# Patient Record
Sex: FEMALE | Employment: STUDENT | URBAN - METROPOLITAN AREA
[De-identification: names, ages, dates, MRNs, and addresses within clinical notes are randomized per-mention and may not be internally consistent; named-entity substitution may affect disease eponyms.]

---
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
## 2020-01-04 DIAGNOSIS — R53.83 FATIGUE, UNSPECIFIED TYPE: Primary | ICD-10-CM

## 2020-01-06 DIAGNOSIS — R53.83 FATIGUE, UNSPECIFIED TYPE: ICD-10-CM

## 2020-01-06 LAB
FERRITIN SERPL-MCNC: 44 NG/ML (ref 12–150)
HGB BLD-MCNC: 13.7 G/DL (ref 11.7–15.7)

## 2020-03-10 ENCOUNTER — HEALTH MAINTENANCE LETTER (OUTPATIENT)
Age: 20
End: 2020-03-10

## 2020-09-11 DIAGNOSIS — Z11.59 SPECIAL SCREENING EXAMINATION FOR VIRAL DISEASE: ICD-10-CM

## 2020-09-13 DIAGNOSIS — Z11.59 SPECIAL SCREENING EXAMINATION FOR VIRAL DISEASE: ICD-10-CM

## 2020-09-13 LAB
COVID-19 ANTIBODY IGG: NEGATIVE
LAB TEST METHOD: NORMAL
SARS-COV-2 RNA SPEC QL NAA+PROBE: NOT DETECTED
SARS-COV-2 RNA SPEC QL NAA+PROBE: NOT DETECTED
SPECIMEN SOURCE: NORMAL
SPECIMEN SOURCE: NORMAL

## 2020-10-30 ENCOUNTER — DOCUMENTATION ONLY (OUTPATIENT)
Dept: FAMILY MEDICINE | Facility: CLINIC | Age: 20
End: 2020-10-30

## 2020-11-07 NOTE — PROGRESS NOTES
[facility] ATHLETIC MEDICINE  [facility]   Sport Psychology Intake Note      Location of Visit: Encounter was conducted via telehealth. Barbara reports that her physical location is: [address]  Date of Visit: October 30, 2020  Duration of Session: 45 minutes  Referred by: self    Barbara Olivera is a 20 year old American female.  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 She is not an international student.     Self-reported Concerns/Symptoms:  Academic concerns  Anxiety/worry  Concentration/attention  Family problems  Identity  Leadership    Presenting Concern:  Desire to explore and process difficult emotions and receive supportive counseling related to life stressors.    Suicide and Risk Assessment:  Recent suicidal thoughts: No  Past suicidal thoughts: No  Recent homicidal thoughts: No  Any attempts in the past: No    Barbara denies current urges to self-harm, homicidal ideation, suicidal ideation, means, plans, or intent.    Mental Status & Observations:  Barbara appeared generally alert and oriented. Dress was appropriate to the weather and occasion. Grooming and hygiene were appropriate. Eye contact was good. Speech was of normal volume and normal. Thought processes were relevant, logical and goal-directed. Thought content was within normal limits with no evidence of psychotic or paranoid features. Memory appeared intact. She exhibited normal motor activity during the appointment. Mood was appropriate with congruent affect. Insight and judgment appeared age appropriate with good focus in session.  Behavior was cooperative and engaging    Family Background:  Born and raised in [place]. Mother and father are  and live in family home in [place]. Barbara is the oldest of seven, youngest sibling is three years old. Reports that relationship with parents is good/supportive, describes her mom as her bestfriend during childhood/upbringing. At present,  closer with father who she describes as her second . Reports closest sibling relationship to be with her twelve year old brother who has a diagnosis of autism.     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 Reports that she is doing well academically.     Social:  Barbara reports having a supportive social community network of peers. She noted conflict with ex-friend/romantic partner that developed into a legal situation to address stalking behavior and harassment from the individual.     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 She has been engaged in the sport for thirteen years. Reports good relationship with teammates and coaches.    History of medical and mental health concerns:  Concussion: No  Current/past sports injury: Yes: History of stress fractures. No current injury endorsed.  Nutrition/eating/appetite: No  Body image: No  Sleep: No   Substance use (alcohol, caffeine, tobacco, cannabis, other): No  Family history of substance abuse: No  Medications/vitamins/supplements: None  Concentration/focus/ADHD: No  Caffeine use: No  PTSD/trauma/abuse: No  Significant loss: No  Known history of mental health in self: No  History of therapy or prescribed medications: No  Known history of mental health in family member(s): No  Legal issues: Yes, currently addressing legal concern in which she was the target of stalking behavior from a known peer.    Coping skills, strengths and supports:   Social support system and Use of available services    Clinical impressions or Other:  Barbara arrived to session on time and was actively engaged. Encounter served as intake session to review confidentiality, establish rapport, and determine goals for services.    Background and history was gathered. Empathetic listening and support provided throughout.     Therapy  objectives/goals:  Provide support  Teach and improve coping skills    Therapy follow-up plan:  Individual counseling sessions as needed      Selma Le PsyD

## 2020-11-09 NOTE — PROGRESS NOTES
I have reviewed and agree with the document of this note.  I did not personally see the patient.    Rm Weiner, PhD LP, CMPC

## 2020-11-13 ENCOUNTER — DOCUMENTATION ONLY (OUTPATIENT)
Dept: FAMILY MEDICINE | Facility: CLINIC | Age: 20
End: 2020-11-13

## 2020-11-19 NOTE — PROGRESS NOTES
[facility] ATHLETIC MEDICINE  [facility]   Sport Psychology Progress Note      Location of Visit: Encounter was conducted via telehealth. Barbara reports that her physical location is: [address]  Date of Visit: November 13, 2020  Duration of Session: 45 minutes    Suicide Assessment:  Barbara denies current urges to self-harm, suicidal ideation, means, plan, or intent.    Mental Status & Observations:  Barbara appeared generally alert and oriented. Dress was appropriate to the weather and occasion. Grooming and hygiene were appropriate. Eye contact was good. Speech was of normal volume and normal. Mood was appropriate with congruent affect. Thought processes were relevant, logical and goal-directed. Thought content was within normal limits with no evidence of psychotic or paranoid features. Memory appeared intact. Insight and judgment appeared age appropriate with good focus in session.  She exhibited normal motor activity during the appointment.  Behavior was cooperative and engaging.      Observations and response to counseling:  Barbara arrived to session on time and was actively engaged throughout.     Noted increased stress related to presidential election. Reports recent difficult conversations with adopted white parents on issues related to racial identity. Session largely utilized to explore elements of her Black identity.    Reports academic concern, specifically, not performing well in one class (intro to financial report writing).     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 Addressed conflict within team dynamic.    Intervention:  Empathetic listening and supportive counseling provided throughout. Explored aspects of her racial identity. Discussed conflict when talking about race with adopted white parents. Noted challenges exploring her identity while growing  up. Identified how this has impacted her ability to connect with Black peers at times.    Validated experience and reaffirmed the many facets of the Black identity.     Encouraged use of effective communication with parents, praised athlete's willingness to take on leadership role within [employer].    Encouraged use of time management, stress reductions strategies, and self care activity.     Therapy objectives/goals:  Increase self-awareness  Increase willingness to be vulnerable and experience emotions  Improve and develop time-management skills  Provide support  Teach and improve coping skills    Therapy follow-up plan:  Individual counseling sessions biweekly      Selma Le PsyD

## 2020-11-25 ENCOUNTER — OFFICE VISIT (OUTPATIENT)
Dept: ORTHOPEDICS | Facility: CLINIC | Age: 20
End: 2020-11-25
Payer: COMMERCIAL

## 2020-11-25 VITALS
DIASTOLIC BLOOD PRESSURE: 83 MMHG | BODY MASS INDEX: 21.33 KG/M2 | HEART RATE: 69 BPM | WEIGHT: 144 LBS | HEIGHT: 69 IN | SYSTOLIC BLOOD PRESSURE: 118 MMHG

## 2020-11-25 DIAGNOSIS — M84.30XA STRESS REACTION OF BONE: Primary | ICD-10-CM

## 2020-11-25 ASSESSMENT — MIFFLIN-ST. JEOR: SCORE: 1487.56

## 2020-11-25 NOTE — PROGRESS NOTES
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 Noticed after starting back to hurdles. Has h/o left 2nd MT stress fracture in high school and stress reaction in 3/2019. Has been pain free since then until now. Localizes over prox 3rd MT. Pain after running. No swelling. Periods normal - on OCP. No concerns about body image. Not restricting any food groups. Weight stable.          Medications:     Current Outpatient Medications   Medication Sig     amoxicillin-clavulanate (AUGMENTIN) 500-125 MG per tablet Take 1 tablet by mouth 2 times daily (Patient not taking: Reported on 1/30/2019)     aspirin-acetaminophen-caffeine (EXCEDRIN MIGRAINE) 250-250-65 MG tablet Take 1 tablet by mouth every 6 hours as needed for headaches     No current facility-administered medications for this visit.              Allergies:      Allergies   Allergen Reactions     Seasonal Allergies             Review of Systems:   A comprehensive 10 point review of systems (constitutional, ENT, cardiac, peripheral vascular, respiratory, GI, , Musculoskeletal, skin, Neurological) was performed and found to be negative except as described in this note.           Physical Exam:   COMPLETE EXAMINATION:   VITAL SIGNS: /83   Pulse 69   Ht 1.753 m (5' 9\")   Wt 65.3 kg (144 lb)   BMI 21.27 kg/m    GEN: Alert, well-nourished, and in no distress.   NEURO: Alert and oriented to person, place, and time. Gait normal.   SKIN: No rash, warmth or erythema  PSYCH: Mood, memory, affect and judgment normal.   MSK: left foot: no swelling, TTP along proximal 3rd MT, mild TTP prox 2nd MT; SLH only mildly +         Imaging:   MR left foot without  contrast 1/31/2019 2:02 PM     History: Stress (fatigue) fx, known, foot, sports eval; 2nd MT " pain,  h/o prior stress fracture; Stress fracture of metatarsal bone of left  foot, initial encounter     Techniques: Multiplanar multisequence imaging of the left foot was  obtained without  administration of intravenous contrast.     Comparison: 1/29/2019     Findings:     Bones     A marker is placed at the dorsal aspect of the forefoot over the  second metatarsal base. Underlying marker, there is questionable  subtle T2 hyperintensity without associated T1 hypointensity involving  the base of the second metatarsal, likely low-grade stress reaction  without associated fracture line. No underlying subcutaneous edema or  soft tissue swelling.     Joints and periarticular soft tissue     Joint effusion: Physiologic amount of joint fluid are present.     Plantar plates: Intersesamoidal ligament and sesamoidal phalangeal  ligaments of the first metatarsophalangeal joints are intact. Plantar  plates of the second through fifth toe at metatarsophalangeal joints  are grossly intact.     Intermetatarsal spaces: No interdigital neuroma. No intermetatarsal  bursitis.     Ligaments and Tendons     Lisfranc interosseous ligament: Intact.     Tendons: The visualized courses of flexor and extensor tendons are  intact.      Muscles     No atrophy or edema.     ANCILLARY FINDINGS                                                                         Impression: Corresponding to the marker, questionable subtle T2  hyperintensity without associated T1 hypointensity involving the base  of the second metatarsal, likely low-grade stress reaction without  associated fracture line.           Assessment:     Recurrent left foot stress reaction - now localizing along 3rd proximal MT        Plan:     1. CAM boot for the next 2-4 weeks.  2. Cross train limiting impact and running under direction of ATC.  3. Follow up in 1 month. If sx fail to improve, will obtain repeat imaging at that time.    Karla Mercado M.D.    CAESAR Hannah  was present for the entire visit.

## 2020-11-25 NOTE — LETTER
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 Noticed after starting back to hurdles. Has h/o left 2nd MT stress fracture in high school and stress reaction in 3/2019. Has been pain free since then until now. Localizes over prox 3rd MT. Pain after running. No swelling. Periods normal - on OCP. No concerns about body image. Not restricting any food groups. Weight stable.          Medications:     Current Outpatient Medications   Medication Sig     amoxicillin-clavulanate (AUGMENTIN) 500-125 MG per tablet Take 1 tablet by mouth 2 times daily (Patient not taking: Reported on 1/30/2019)     aspirin-acetaminophen-caffeine (EXCEDRIN MIGRAINE) 250-250-65 MG tablet Take 1 tablet by mouth every 6 hours as needed for headaches     No current facility-administered medications for this visit.              Allergies:      Allergies   Allergen Reactions     Seasonal Allergies             Review of Systems:   A comprehensive 10 point review of systems (constitutional, ENT, cardiac, peripheral vascular, respiratory, GI, , Musculoskeletal, skin, Neurological) was performed and found to be negative except as described in this note.           Physical Exam:   COMPLETE EXAMINATION:   VITAL SIGNS: /83   Pulse 69   Ht 1.753 m (5' 9\")   Wt 65.3 kg (144 lb)   BMI 21.27 kg/m    GEN: Alert, well-nourished, and in no distress.   NEURO: Alert and oriented to person, place, and time. Gait normal.   SKIN: No rash, warmth or erythema  PSYCH: Mood, memory, affect and judgment normal.   MSK: left foot: no swelling, TTP along proximal 3rd MT, mild TTP prox 2nd MT; SLH only mildly +         Imaging:   MR left foot without  contrast " 1/31/2019 2:02 PM     History: Stress (fatigue) fx, known, foot, sports eval; 2nd MT pain,  h/o prior stress fracture; Stress fracture of metatarsal bone of left  foot, initial encounter     Techniques: Multiplanar multisequence imaging of the left foot was  obtained without  administration of intravenous contrast.     Comparison: 1/29/2019     Findings:     Bones     A marker is placed at the dorsal aspect of the forefoot over the  second metatarsal base. Underlying marker, there is questionable  subtle T2 hyperintensity without associated T1 hypointensity involving  the base of the second metatarsal, likely low-grade stress reaction  without associated fracture line. No underlying subcutaneous edema or  soft tissue swelling.     Joints and periarticular soft tissue     Joint effusion: Physiologic amount of joint fluid are present.     Plantar plates: Intersesamoidal ligament and sesamoidal phalangeal  ligaments of the first metatarsophalangeal joints are intact. Plantar  plates of the second through fifth toe at metatarsophalangeal joints  are grossly intact.     Intermetatarsal spaces: No interdigital neuroma. No intermetatarsal  bursitis.     Ligaments and Tendons     Lisfranc interosseous ligament: Intact.     Tendons: The visualized courses of flexor and extensor tendons are  intact.      Muscles     No atrophy or edema.     ANCILLARY FINDINGS                                                                         Impression: Corresponding to the marker, questionable subtle T2  hyperintensity without associated T1 hypointensity involving the base  of the second metatarsal, likely low-grade stress reaction without  associated fracture line.           Assessment:     Recurrent left foot stress reaction - now localizing along 3rd proximal MT        Plan:     1. CAM boot for the next 2-4 weeks.  2. Cross train limiting impact and running under direction of ATC.  3. Follow up in 1 month. If sx fail to improve, will  obtain repeat imaging at that time.    Karla Mercado M.D.    CAESAR Hannah was present for the entire visit.        Karla Mercado MD

## 2020-12-04 ENCOUNTER — DOCUMENTATION ONLY (OUTPATIENT)
Dept: FAMILY MEDICINE | Facility: CLINIC | Age: 20
End: 2020-12-04

## 2020-12-14 NOTE — PROGRESS NOTES
[facility] ATHLETIC MEDICINE  [facility]   Sport Psychology Progress Note      Location of Visit: Encounter was conducted via telehealth. Barbara reports that her physical location is: [address]  Date of Visit: December 4, 2020  Duration of Session: 45 minutes    Suicide Assessment:  Barbara denies current urges to self-harm, suicidal ideation, means, plan, or intent.    Mental Status & Observations:  Barbara appeared generally alert and oriented. Dress was appropriate to the weather and occasion. Grooming and hygiene were appropriate. Eye contact was good. Speech was of normal volume and normal. Mood was appropriate with congruent affect. Thought processes were relevant, logical and goal-directed. Thought content was within normal limits with no evidence of psychotic or paranoid features. Memory appeared intact. Insight and judgment appeared age appropriate with good focus in session.  She exhibited normal motor activity during the appointment.  Behavior was cooperative and engaging.      Observations and response to counseling:  Barbara arrived to session on time and was actively engaged throughout.     Reports feelings of sadness and increased stressors. Noted academic and sport related stress. Reports sustaining a sport related injury (stress reaction in foot) and is in a boot to recover.    Reports improvement in ability to communicate with coaches and peers and has noticed moments when she was kinder and more compassionate toward herself.     Intervention:  Empathetic listening and supportive counseling provided throughout. Validate and normalized stressors. 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 Noted feeling tired from the academic semester and endorsed continued low motivation to complete academic tasks. Reviewed self compassion and loving kindness exercises to utilize and time management strategies.      Therapy objectives/goals:  Increase self-awareness  Increase willingness to be vulnerable and experience emotions  Improve and develop time-management skills  Provide support  Teach and improve coping skills    Therapy follow-up plan:  Individuals counseling sessions as needed      Selma Le PsyD

## 2020-12-16 ENCOUNTER — OFFICE VISIT (OUTPATIENT)
Dept: ORTHOPEDICS | Facility: CLINIC | Age: 20
End: 2020-12-16
Payer: COMMERCIAL

## 2020-12-16 VITALS
HEIGHT: 69 IN | DIASTOLIC BLOOD PRESSURE: 84 MMHG | BODY MASS INDEX: 21.59 KG/M2 | SYSTOLIC BLOOD PRESSURE: 129 MMHG | WEIGHT: 145.8 LBS | HEART RATE: 96 BPM

## 2020-12-16 DIAGNOSIS — M84.30XA STRESS REACTION OF BONE: Primary | ICD-10-CM

## 2020-12-16 ASSESSMENT — MIFFLIN-ST. JEOR: SCORE: 1495.72

## 2020-12-16 NOTE — LETTER
Date:December 18, 2020      Patient was self referred, no letter generated. Do not send.        [facility] Information

## 2020-12-16 NOTE — LETTER
"  12/16/2020      RE: Barbara Olivera  [address]  [place] [address]       [facility] FOLLOW UP    Barbara Olievra MRN# [id number]   Age: 20 year old YOB: 2000           Chief Complaint:     Follow up left foot stress reaction          History of Present Illness:     21 yo [employer] here to follow up on her left 3rd MT presumed stress reaction. Since our last visit on 11/25/20, she wore a CAM boot for 3 weeks and has been cross training, resting from impact. Feeling good now, no pain.          Medications:     Current Outpatient Medications   Medication Sig     levonorgestrel (MIRENA) 20 MCG/24HR IUD 1 each by Intrauterine route once     amoxicillin-clavulanate (AUGMENTIN) 500-125 MG per tablet Take 1 tablet by mouth 2 times daily (Patient not taking: Reported on 1/30/2019)     aspirin-acetaminophen-caffeine (EXCEDRIN MIGRAINE) 250-250-65 MG tablet Take 1 tablet by mouth every 6 hours as needed for headaches     No current facility-administered medications for this visit.              Allergies:      Allergies   Allergen Reactions     Seasonal Allergies             Review of Systems:   A comprehensive 10 point review of systems (constitutional, ENT, cardiac, peripheral vascular, respiratory, GI, , Musculoskeletal, skin, Neurological) was performed and found to be negative except as described in this note.           Physical Exam:   COMPLETE EXAMINATION:   VITAL SIGNS: /84   Pulse 96   Ht 1.753 m (5' 9\")   Wt 66.1 kg (145 lb 12.8 oz)   LMP 11/23/2020 (Approximate)   BMI 21.53 kg/m    GEN: Alert, well-nourished, and in no distress.   NEURO: Alert and oriented to person, place, and time. Gait normal.   SKIN: No rash, warmth or erythema  PSYCH: Mood, memory, affect and judgment normal.   MSK: left foot: minimal TTP along proximal 3rd MT, no pain with SLH or MT squeeze.           Assessment:     Healing left 3rd MT stress reaction        Plan:     1. Continue to cross train and rest " from impact for one more week, then may begin a gradual RTP under ATC guidance.   2. If pain recurs, MRI.    Karla Mercado M.D.    ATC Leeanne Hannah was present for the entire visit.        Karla Mercado MD

## 2020-12-18 ENCOUNTER — DOCUMENTATION ONLY (OUTPATIENT)
Dept: FAMILY MEDICINE | Facility: CLINIC | Age: 20
End: 2020-12-18

## 2020-12-20 ENCOUNTER — HEALTH MAINTENANCE LETTER (OUTPATIENT)
Age: 20
End: 2020-12-20

## 2021-01-03 NOTE — PROGRESS NOTES
[facility] ATHLETIC MEDICINE  [facility]   Sport Psychology Progress Note      Location of Visit: Encounter was conducted via telehealth. Barbara reports that her physical location is: [address]  Date of Visit: December 18, 2020  Duration of Session: 45 minutes    Suicide Assessment:  Barbara denies current urges to self-harm, suicidal ideation, means, plan, or intent.    Mental Status & Observations:  Barbara appeared generally alert and oriented. Dress was appropriate to the weather and occasion. Grooming and hygiene were appropriate. Eye contact was good. Speech was of normal volume and normal. Mood was appropriate with congruent affect. Thought processes were relevant, logical and goal-directed. Thought content was within normal limits with no evidence of psychotic or paranoid features. Memory appeared intact. Insight and judgment appeared age appropriate with good focus in session.  She exhibited normal motor activity during the appointment.  Behavior was cooperative and engaging.      Observations and response to counseling:  Barbara arrived to session on time and was actively engaged throughout. Reports continued stress related to academic progress and on time completion of end of semester assignments.     Utilized session primarily to explore and process discord in her relationship with her mother.    Intervention:  Empathetic listening and supportive counseling provided throughout.     Provided therapeutic space for Barbara to discuss recent events with her mother that have caused distress and frustration in their relationship.     Explored and processed emotions related to having been adopted by two white identified parents and her struggles related to identifying as a Black woman. Encouraged self exploration and use of journal writing and meditation.     Therapy objectives/goals:  Increase self-awareness  Increase willingness to be vulnerable and experience  emotions  Improve and develop time-management skills  Provide support  Teach and improve coping skills    Therapy follow-up plan:  Individuals counseling sessions as needed      Selma Le PsyD

## 2021-01-08 ENCOUNTER — DOCUMENTATION ONLY (OUTPATIENT)
Dept: FAMILY MEDICINE | Facility: CLINIC | Age: 21
End: 2021-01-08

## 2021-01-15 ENCOUNTER — DOCUMENTATION ONLY (OUTPATIENT)
Dept: FAMILY MEDICINE | Facility: CLINIC | Age: 21
End: 2021-01-15

## 2021-01-20 NOTE — PROGRESS NOTES
[facility] ATHLETIC MEDICINE  [facility]   Sport Psychology Progress Note      Location of Visit: Encounter was conducted via telehealth. Barbara reports that her physical location is: [address]  Date of Visit: January 8, 2021  Duration of Session: 45 minutes    Suicide Assessment:  Barbara denies current urges to self-harm, suicidal ideation, means, plan, or intent.    Mental Status & Observations:  Barbara appeared generally alert and oriented. Dress was appropriate to the weather and occasion. Grooming and hygiene were appropriate. Eye contact was good. Speech was of normal volume and normal. Mood was appropriate with congruent affect. Thought processes were relevant, logical and goal-directed. Thought content was within normal limits with no evidence of psychotic or paranoid features. Memory appeared intact. Insight and judgment appeared age appropriate with good focus in session.  She exhibited normal motor activity during the appointment.  Behavior was cooperative and engaging.      Observations and response to counseling:  Barbara arrived to session on time and was actively engaged throughout.    Reports no significant change in mood or stressors. Continued use of the session to explore and process Black identity and family relationships.    Intervention:  Empathetic listening and supportive counseling provided throughout. 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    Engaged in problem solving process to address academic eligibility and effective communication with professors.    Largely utilized session to continue dialogue about Black identity, interactions with white parents, and desire to learn more about her biological parents.    Therapy objectives/goals:  Increase self-awareness  Increase willingness to be vulnerable and experience emotions  Improve and develop  time-management skills  Provide support  Teach and improve coping skills    Therapy follow-up plan:  Individuals counseling sessions as needed      Selma Le PsyD

## 2021-01-23 NOTE — PROGRESS NOTES
[facility] ATHLETIC MEDICINE  [facility]   Sport Psychology Progress Note      Location of Visit: Encounter was conducted via telehealth. Barbara reports that her physical location is: [address]  Date of Visit: January 15, 2021  Duration of Session: 45 minutes    Suicide Assessment:  Barbara denies current urges to self-harm, suicidal ideation, means, plan, or intent.    Mental Status & Observations:  Barbara appeared generally alert and oriented. Dress was appropriate to the weather and occasion. Grooming and hygiene were appropriate. Eye contact was good. Speech was of normal volume and normal. Mood was appropriate with congruent affect. Thought processes were relevant, logical and goal-directed. Thought content was within normal limits with no evidence of psychotic or paranoid features. Memory appeared intact. Insight and judgment appeared age appropriate with good focus in session.  She exhibited normal motor activity during the appointment.  Behavior was cooperative and engaging.      Observations and response to counseling:  Barbara arrived to session on time and was actively engaged throughout.    Noted that she'd managed to complete necessary items to be academically eligible, was effectively utilizing imagery mental skill in practice, and was feeling a decrease in stress.    Session was utilized to mainly discuss and process continued theme of identity and communicating needs to her adopted parents as it related to Black identity.    Intervention:  Empathetic listening and supportive counseling provided throughout.     Barbara shared that she'd received ancestry DNA results, shared them with her family, and had not gotten a desirable reaction form her parents. Offered validation and engaged Barbara in supportive dialogue about her reaction to the results and frustration and sadness about her parents' response.    Encouraged continued advocacy and expression of needs and  utilization of self care and compassion exercises.     Therapy objectives/goals:  Increase self-awareness  Increase willingness to be vulnerable and experience emotions  Improve and develop time-management skills  Provide support  Teach and improve coping skills    Therapy follow-up plan:  Individuals counseling sessions as needed      Selma Le PsyD

## 2021-01-29 ENCOUNTER — DOCUMENTATION ONLY (OUTPATIENT)
Dept: FAMILY MEDICINE | Facility: CLINIC | Age: 21
End: 2021-01-29

## 2021-02-07 NOTE — PROGRESS NOTES
[facility] ATHLETIC MEDICINE  [facility]   Sport Psychology Progress Note      Location of Visit: Encounter was conducted via telehealth. Barbara reports that her physical location is: [address]  Date of Visit: January 29, 2021  Duration of Session: 45 minutes    Suicide Assessment:  Barbara denies current urges to self-harm, suicidal ideation, means, plan, or intent.    Mental Status & Observations:  Barbara appeared generally alert and oriented. Dress was appropriate to the weather and occasion. Grooming and hygiene were appropriate. Eye contact was good. Speech was of normal volume and normal. Mood was appropriate with congruent affect. Thought processes were relevant, logical and goal-directed. Thought content was within normal limits with no evidence of psychotic or paranoid features. Memory appeared intact. Insight and judgment appeared age appropriate with good focus in session.  She exhibited normal motor activity during the appointment.  Behavior was cooperative and engaging.      Observations and response to counseling:  Barbara arrived to session on time and was actively engaged throughout.    Reports feeling overwhelmed about academic workload and increased anxiety and frequent headaches. Decreased restfulness during sleep attributed to late nights completing academic tasks.     Intervention:  Empathetic listening and supportive counseling provided throughout. Discussed recent project for [employer] that peaked her interest and brought enjoyment to her day. Reports improvement in sport performance and excitement about upcoming meets. 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 Also disclosed that she is no longer trying to eat vegan as it turned out to be difficult for her, financially problematic, and may have been a cause of her frequent headaches and low energy.    Reviewed and encouraged use of time management and  organization strategies as well as engagement in self care and mindfulness activities.     Therapy objectives/goals:  Increase self-awareness  Increase willingness to be vulnerable and experience emotions  Improve and develop time-management skills  Provide support  Teach and improve coping skills    Therapy follow-up plan:  Individuals counseling sessions as needed      Selma Le PsyD

## 2021-02-12 ENCOUNTER — DOCUMENTATION ONLY (OUTPATIENT)
Dept: FAMILY MEDICINE | Facility: CLINIC | Age: 21
End: 2021-02-12

## 2021-02-23 NOTE — PROGRESS NOTES
[facility] ATHLETIC MEDICINE  [facility]   Sport Psychology Progress Note      Location of Visit: Encounter was conducted via telehealth. Barbara reports that her physical location is: [address]  Date of Visit: February 12, 2021  Duration of Session: 45 minutes    Suicide Assessment:  Barbara denies current urges to self-harm, suicidal ideation, means, plan, or intent.    Mental Status & Observations:  Barbara appeared generally alert and oriented. Dress was appropriate to the weather and occasion. Grooming and hygiene were appropriate. Eye contact was good. Speech was of normal volume and normal. Mood was appropriate with congruent affect. Thought processes were relevant, logical and goal-directed. Thought content was within normal limits with no evidence of psychotic or paranoid features. Memory appeared intact. Insight and judgment appeared age appropriate with good focus in session.  She exhibited normal motor activity during the appointment.  Behavior was cooperative and engaging.      Observations and response to counseling:  Barbara arrived to session on time and was actively engaged throughout.    Reports having completed a 60 robb practice meet today and expressed feeling physically fit and prepared, although, noticed nervousness about re-injury. Utilized session to explore and process difficult emotions, sport performance, and academic progress.     Intervention:  Empathetic listening and supportive counseling provided throughout. Validated and normalized concerns about re-injury. Reviewed mental skills to aid with increasing confidence and trust.    Encouraged continued use of time management strategies to organize academic tasks.    Celebrated accomplishments disclosed (improved grades, secured internship, and declared minors).    Therapy objectives/goals:  Increase self-awareness  Increase willingness to be vulnerable and experience emotions  Improve and develop  time-management skills  Provide support  Teach and improve coping skills    Therapy follow-up plan:  Individuals counseling sessions as needed      Selma Le PsyD

## 2021-02-26 ENCOUNTER — DOCUMENTATION ONLY (OUTPATIENT)
Dept: FAMILY MEDICINE | Facility: CLINIC | Age: 21
End: 2021-02-26

## 2021-03-07 NOTE — PROGRESS NOTES
[facility] ATHLETIC MEDICINE  [facility]   Sport Psychology Progress Note      Location of Visit: Encounter was conducted via telehealth. Barbara reports that her physical location is: [address]  Date of Visit: February 26, 2021  Duration of Session: 45 minutes    Suicide Assessment:  Barbara denies current urges to self-harm, suicidal ideation, means, plan, or intent.    Mental Status & Observations:  Barbara appeared generally alert and oriented. Dress was appropriate to the weather and occasion. Grooming and hygiene were appropriate. Eye contact was good. Speech was of normal volume and normal. Mood was appropriate with congruent affect. Thought processes were relevant, logical and goal-directed. Thought content was within normal limits with no evidence of psychotic or paranoid features. Memory appeared intact. Insight and judgment appeared age appropriate with good focus in session.  She exhibited normal motor activity during the appointment.  Behavior was cooperative and engaging.      Observations and response to counseling:  Barbara arrived to session on time and was actively engaged throughout.    Noted that she did not attend [employer] and reports improvement in sport performance during practices. Utilized this session to discuss academic progress and recent conflict with her romantic partner.    Intervention:  Empathetic listening and supportive counseling provided throughout.     Discussed and processed emotions related to academics. Barbara reports that she has improved her grades in all but one class, food science. Noted plan to continue to engage in tutoring and reach out to the professor for additional assignments.    Disclosed recent argument between herself and boyfriend on the topic of adoption. Barbara, having had personal experiences with adoption, was offended by comments her partner made. Encouraged Barbara to effectively and openly communicate her concerns with her  partner rather than holding these feelings in.    Encouraged continued use of stress and time management strategies.    Therapy objectives/goals:  Increase self-awareness  Increase willingness to be vulnerable and experience emotions  Improve and develop time-management skills  Provide support  Teach and improve coping skills    Therapy follow-up plan:  Individuals counseling sessions as needed      Selma Le PsyD

## 2021-03-12 ENCOUNTER — DOCUMENTATION ONLY (OUTPATIENT)
Dept: FAMILY MEDICINE | Facility: CLINIC | Age: 21
End: 2021-03-12
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
## 2021-04-09 ENCOUNTER — DOCUMENTATION ONLY (OUTPATIENT)
Dept: FAMILY MEDICINE | Facility: CLINIC | Age: 21
End: 2021-04-09

## 2021-04-16 NOTE — PROGRESS NOTES
[facility] ATHLETIC MEDICINE  [facility]   Sport Psychology Progress Note      Location of Visit: Encounter was conducted via telehealth. Barbara reports that her physical location is: [address]  Date of Visit: April 9, 2021  Duration of Session: 45 minutes    Suicide Assessment:  Barbara denies current urges to self-harm, suicidal ideation, means, plan, or intent.    Mental Status & Observations:  Barbara appeared generally alert and oriented. Dress was appropriate to the weather and occasion. Grooming and hygiene were appropriate. Eye contact was good. Speech was of normal volume and normal. Mood was appropriate with congruent affect. Thought processes were relevant, logical and goal-directed. Thought content was within normal limits with no evidence of psychotic or paranoid features. Memory appeared intact. Insight and judgment appeared age appropriate with good focus in session.  She exhibited normal motor activity during the appointment.  Behavior was cooperative and engaging.      Observations and response to counseling:  Barbara arrived to session on time and was actively engaged throughout.    Barbara presented as cooperative and candid throughout and expressed affect congruent with topics discussed.     Reports experiencing a poor sport performance during recent competition as well as a difficult week of practice overall. Barbara noted recent challenges in her romantic relationship having contributed to poor performances and feeling distracted.     Utilized session to explore/process romantic relationship and goals for sport.    Intervention:  Empathetic listening and supportive counseling provided throughout.     Discussed relationship dynamics that often left Barbara feeling as if she was unable to voice concerns to her partner. Reflected on conversations between her and her partner that made her uncomfortable, often related to race.    Barbara determined that she would consider  ending the romantic relationship to work on herself and continue exploring her identities.     Encouraged Barbara to prioritize her needs and use clear and effective communication to express herself.    Reviewed relaxation and mental skills to improve sport performance and increase confidence.     Therapy objectives/goals:  Increase self-awareness  Increase willingness to be vulnerable and experience emotions  Improve and develop time-management skills  Provide support  Teach and improve coping skills    Therapy follow-up plan:  Individuals counseling sessions as needed      Selma Le PsyD

## 2021-04-24 ENCOUNTER — HEALTH MAINTENANCE LETTER (OUTPATIENT)
Age: 21
End: 2021-04-24

## 2021-04-28 ENCOUNTER — DOCUMENTATION ONLY (OUTPATIENT)
Dept: FAMILY MEDICINE | Facility: CLINIC | Age: 21
End: 2021-04-28
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
## 2021-06-28 DIAGNOSIS — Z13.6 SCREENING FOR HEART DISEASE: Primary | ICD-10-CM

## 2021-06-29 DIAGNOSIS — Z13.6 SCREENING FOR HEART DISEASE: ICD-10-CM

## 2021-07-09 ENCOUNTER — DOCUMENTATION ONLY (OUTPATIENT)
Dept: ORTHOPEDICS | Facility: CLINIC | Age: 21
End: 2021-07-09

## 2021-07-09 LAB — INTERPRETATION ECG - MUSE: NORMAL

## 2021-07-09 NOTE — PROGRESS NOTES
EKG clearance  Barbara Olivera's EKG performed for clearance to participate in intercollegiate athletics at the [facility] has been reviewed on 07/09/21.  Findings are abnormal: rightward axis, septal infarct (likely inaccurate machine read).      Additional follow up is needed at this time.   Follow up tests: ECHO    Barbara Olivera is not cleared  to participate at this time.     Karla Mercado MD

## 2021-07-14 NOTE — PROGRESS NOTES
EKG clearance  Barbara Olivera's EKG performed for clearance to participate in intercollegiate athletics at the [facility] has been reviewed on 07/14/21.  Findings are within normal limits.      Additional follow up is not needed at this time.   Follow up tests: none    Barbara Olivera is cleared to participate at this time.     Karla Mercado MD

## 2021-07-22 DIAGNOSIS — R94.31 ABNORMAL EKG: Primary | ICD-10-CM

## 2021-07-30 ENCOUNTER — DOCUMENTATION ONLY (OUTPATIENT)
Dept: ORTHOPEDICS | Facility: CLINIC | Age: 21
End: 2021-07-30

## 2021-07-30 ENCOUNTER — ANCILLARY PROCEDURE (OUTPATIENT)
Dept: CARDIOLOGY | Facility: CLINIC | Age: 21
End: 2021-07-30
Attending: PEDIATRICS
Payer: COMMERCIAL

## 2021-07-30 DIAGNOSIS — R94.31 ABNORMAL EKG: ICD-10-CM

## 2021-07-30 LAB — LVEF ECHO: NORMAL

## 2021-07-30 PROCEDURE — 93306 TTE W/DOPPLER COMPLETE: CPT | Performed by: INTERNAL MEDICINE

## 2021-07-30 NOTE — PROGRESS NOTES
EKG clearance  Barbara Olivera's EKG performed for clearance to participate in intercollegiate athletics at the [facility] has been reviewed on 07/30/21.  Findings were abnormal, so ECHO obtained which was normal.      Additional follow up is not needed at this time.   Follow up tests: none    Barbara Olivera is cleared to participate at this time.     Karla Mercado MD

## 2021-09-03 ENCOUNTER — DOCUMENTATION ONLY (OUTPATIENT)
Dept: FAMILY MEDICINE | Facility: CLINIC | Age: 21
End: 2021-09-03

## 2021-09-03 DIAGNOSIS — F41.9 ANXIETY: Primary | ICD-10-CM

## 2021-09-13 ENCOUNTER — DOCUMENTATION ONLY (OUTPATIENT)
Dept: FAMILY MEDICINE | Facility: CLINIC | Age: 21
End: 2021-09-13

## 2021-09-13 DIAGNOSIS — F41.9 ANXIETY: Primary | ICD-10-CM
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
## 2021-09-20 NOTE — PROGRESS NOTES
[facility] ATHLETIC MEDICINE  [facility]   Sport Psychology Progress Note      Location of Visit: Encounter was conducted via telehealth. Barbara reports that her physical location is: [address]  Date of Visit: September 13, 2021  Duration of Session: 45 minutes    Suicide Assessment:  Barbara denies current urges to self-harm, suicidal ideation, means, plan, or intent.    Mental Status & Observations:  Barbara appeared generally alert and oriented. Dress was appropriate to the weather and occasion. Grooming and hygiene were appropriate. Eye contact was good. Speech was of normal volume and normal. Mood was appropriate with congruent affect. Thought processes were relevant, logical and goal-directed. Thought content was within normal limits with no evidence of psychotic or paranoid features. Memory appeared intact. Insight and judgment appeared age appropriate with good focus in session.  She exhibited normal motor activity during the appointment.  Behavior was cooperative and engaging.      Observations and response to counseling:  Barbara arrived to session on time and was actively engaged throughout.    Barbara presented as cooperative and candid throughout and expressed affect and mood that was congruent to content discussed.     Noted that the first week of school had gone well. Discussed course schedule and reviewed time management strategies.    Reports that she is still awaiting surgery and plans to follow up with her sport med physician.     Utilized session to explore sport related performance goals and interpersonal conflicts with romantic interests.    Intervention:  Empathetic listening and supportive counseling provided throughout.     Engaged in supportive conversation about sport performance, goals for the season, and upcoming team retreat. Barbara noted that she is currently limited in practice but is excited for the season and happy to be among her  teammates.    Primarily discussed recent romantic relationships and interpersonal conflict. Encouraged Barbara to engage in open and honest communication with romantic interests and clearly state her needs.    Therapy objectives/goals:  Increase self-awareness  Increase willingness to be vulnerable and experience emotions  Improve and develop time-management skills  Provide support  Teach and improve coping skills    Therapy follow-up plan:  Individuals counseling sessions as needed      Selma Le PsyD

## 2021-09-27 ENCOUNTER — DOCUMENTATION ONLY (OUTPATIENT)
Dept: FAMILY MEDICINE | Facility: CLINIC | Age: 21
End: 2021-09-27
Payer: COMMERCIAL

## 2021-10-03 ENCOUNTER — HEALTH MAINTENANCE LETTER (OUTPATIENT)
Age: 21
End: 2021-10-03

## 2021-10-11 ENCOUNTER — DOCUMENTATION ONLY (OUTPATIENT)
Dept: FAMILY MEDICINE | Facility: CLINIC | Age: 21
End: 2021-10-11

## 2021-10-11 DIAGNOSIS — F43.9 STRESS: Primary | ICD-10-CM

## 2021-10-12 DIAGNOSIS — R53.83 FATIGUE, UNSPECIFIED TYPE: Primary | ICD-10-CM

## 2021-10-13 ENCOUNTER — OFFICE VISIT (OUTPATIENT)
Dept: ORTHOPEDICS | Facility: CLINIC | Age: 21
End: 2021-10-13
Payer: COMMERCIAL

## 2021-10-13 VITALS
WEIGHT: 142.2 LBS | HEIGHT: 69 IN | HEART RATE: 80 BPM | DIASTOLIC BLOOD PRESSURE: 84 MMHG | BODY MASS INDEX: 21.06 KG/M2 | SYSTOLIC BLOOD PRESSURE: 114 MMHG

## 2021-10-13 DIAGNOSIS — J34.2 DEVIATED SEPTUM: Primary | ICD-10-CM

## 2021-10-13 ASSESSMENT — MIFFLIN-ST. JEOR: SCORE: 1474.39

## 2021-10-13 NOTE — LETTER
10/13/2021      RE: Barbara Olivera  [address]  [place] [address]          PREOPERATIVE PHYSICAL EXAM  Babrara Olivera MRN# [id number]   Age: 21 year old YOB: 2000     Chief Complaint:   Procedure: nasal surgery  Surgeon: 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 Previously attributed it to allergies, but was recently evaluated by ENT and found to have a deviated nasal septum and a mass (at first thought to be polyp, but now etiology is uncertain). Septoplasty was recommended. She has never had surgery or anesthesia before. She is otherwise very healthy.    Patient was seen and examined by me. History, PMH, Meds, SH, complete ROS (10 organ systems) and PE reviewed with patient and prior medical records.              Past Medical History:   History reviewed. No pertinent past medical history.          Past Surgical History:   History reviewed. No pertinent surgical history.          Social History:     Social History     Socioeconomic History     Marital status: Single     Spouse name: Not on file     Number of children: Not on file     Years of education: Not on file     Highest education level: Not on file   Occupational History     Not on file   Tobacco Use     Smoking status: Never Smoker     Smokeless tobacco: Never Used   Substance and Sexual Activity     Alcohol use: No     Drug use: No     Sexual activity: Never   Other Topics Concern     Not on file   Social History Narrative     Not on file     Social Determinants of Health     Financial Resource Strain:      Difficulty of Paying Living Expenses:    Food Insecurity:      Worried About Running Out of Food in the Last Year:      Ran Out of Food in the Last Year:    Transportation Needs:      Lack of Transportation (Medical):      Lack of Transportation (Non-Medical):    Physical Activity:       "Days of Exercise per Week:      Minutes of Exercise per Session:    Stress:      Feeling of Stress :    Social Connections:      Frequency of Communication with Friends and Family:      Frequency of Social Gatherings with Friends and Family:      Attends Mosque Services:      Active Member of Clubs or Organizations:      Attends Club or Organization Meetings:      Marital Status:    Intimate Partner Violence:      Fear of Current or Ex-Partner:      Emotionally Abused:      Physically Abused:      Sexually Abused:              Family History:     Family History   Problem Relation Age of Onset     Lung Cancer Maternal Grandfather      Ovarian Cancer Paternal Grandmother      Autism Spectrum Disorder Brother               Medications:     Current Outpatient Medications   Medication Sig     aspirin-acetaminophen-caffeine (EXCEDRIN MIGRAINE) 250-250-65 MG tablet Take 1 tablet by mouth every 6 hours as needed for headaches     levonorgestrel (MIRENA) 20 MCG/24HR IUD 1 each by Intrauterine route once     No current facility-administered medications for this visit.             Allergies:      Allergies   Allergen Reactions     Seasonal Allergies             Review of Systems:   A comprehensive 10 point review of systems (constitutional, ENT, cardiac, peripheral vascular, respiratory, GI, , Musculoskeletal, skin, Neurological) was performed and found to be negative except as described in this note.           Physical Exam:   COMPLETE EXAMINATION:   VITAL SIGNS: /84   Pulse 80   Ht 1.753 m (5' 9\")   Wt 64.5 kg (142 lb 3.2 oz)   BMI 21.00 kg/m    GEN: Alert, well-nourished, and in no distress.   HEENT:   Head: Normocephalic and atraumatic.   Eyes: PERRLA, EOMI  Ears: TM's normal, external canals normal  Mouth/Throat: MMM, No erythema or exudate.   Neck: supple, no lymphadenopathy  CV: S1S2 normal, RRR, no murmur  CHEST: CTA, Easy effort, No rales or wheezes  ABD: Soft. Nontender/nondistended, no HSM/mass, no " rebound/guarding.  NEURO: Alert and oriented to person, place, and time. Normal reflexes. No cranial nerve deficit. Gait normal. Coordination normal.   SKIN: No rash, warmth or erythema  PSYCH: Mood, memory, affect and judgment normal.           Assessment:     Deviated septum  Preop          Plan:     1. Patient is approved for surgery.  2. Reviewed eating and drinking guidelines prior to surgery.  3. She has never had COVID, has received 2 COVID vaccines and will obtain COVID test per surgeon request prior to surgery.    MD CAESAR Frank was present for the entire visit.        Karla Mercado MD

## 2021-10-13 NOTE — LETTER
Date:October 14, 2021      Patient was self referred, no letter generated. Do not send.        [facility] Health Information

## 2021-10-13 NOTE — PROGRESS NOTES
PREOPERATIVE PHYSICAL EXAM  Barbara Olivera MRN# [id number]   Age: 21 year old YOB: 2000     Chief Complaint:   Procedure: nasal surgery  Surgeon: 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 Previously attributed it to allergies, but was recently evaluated by ENT and found to have a deviated nasal septum and a mass (at first thought to be polyp, but now etiology is uncertain). Septoplasty was recommended. She has never had surgery or anesthesia before. She is otherwise very healthy.    Patient was seen and examined by me. History, PMH, Meds, SH, complete ROS (10 organ systems) and PE reviewed with patient and prior medical records.              Past Medical History:   History reviewed. No pertinent past medical history.          Past Surgical History:   History reviewed. No pertinent surgical history.          Social History:     Social History     Socioeconomic History     Marital status: Single     Spouse name: Not on file     Number of children: Not on file     Years of education: Not on file     Highest education level: Not on file   Occupational History     Not on file   Tobacco Use     Smoking status: Never Smoker     Smokeless tobacco: Never Used   Substance and Sexual Activity     Alcohol use: No     Drug use: No     Sexual activity: Never   Other Topics Concern     Not on file   Social History Narrative     Not on file     Social Determinants of Health     Financial Resource Strain:      Difficulty of Paying Living Expenses:    Food Insecurity:      Worried About Running Out of Food in the Last Year:      Ran Out of Food in the Last Year:    Transportation Needs:      Lack of Transportation (Medical):      Lack of Transportation (Non-Medical):    Physical Activity:      Days of Exercise per Week:      Minutes of Exercise per Session:    Stress:       "Feeling of Stress :    Social Connections:      Frequency of Communication with Friends and Family:      Frequency of Social Gatherings with Friends and Family:      Attends Congregational Services:      Active Member of Clubs or Organizations:      Attends Club or Organization Meetings:      Marital Status:    Intimate Partner Violence:      Fear of Current or Ex-Partner:      Emotionally Abused:      Physically Abused:      Sexually Abused:              Family History:     Family History   Problem Relation Age of Onset     Lung Cancer Maternal Grandfather      Ovarian Cancer Paternal Grandmother      Autism Spectrum Disorder Brother               Medications:     Current Outpatient Medications   Medication Sig     aspirin-acetaminophen-caffeine (EXCEDRIN MIGRAINE) 250-250-65 MG tablet Take 1 tablet by mouth every 6 hours as needed for headaches     levonorgestrel (MIRENA) 20 MCG/24HR IUD 1 each by Intrauterine route once     No current facility-administered medications for this visit.             Allergies:      Allergies   Allergen Reactions     Seasonal Allergies             Review of Systems:   A comprehensive 10 point review of systems (constitutional, ENT, cardiac, peripheral vascular, respiratory, GI, , Musculoskeletal, skin, Neurological) was performed and found to be negative except as described in this note.           Physical Exam:   COMPLETE EXAMINATION:   VITAL SIGNS: /84   Pulse 80   Ht 1.753 m (5' 9\")   Wt 64.5 kg (142 lb 3.2 oz)   BMI 21.00 kg/m    GEN: Alert, well-nourished, and in no distress.   HEENT:   Head: Normocephalic and atraumatic.   Eyes: PERRLA, EOMI  Ears: TM's normal, external canals normal  Mouth/Throat: MMM, No erythema or exudate.   Neck: supple, no lymphadenopathy  CV: S1S2 normal, RRR, no murmur  CHEST: CTA, Easy effort, No rales or wheezes  ABD: Soft. Nontender/nondistended, no HSM/mass, no rebound/guarding.  NEURO: Alert and oriented to person, place, and time. Normal " reflexes. No cranial nerve deficit. Gait normal. Coordination normal.   SKIN: No rash, warmth or erythema  PSYCH: Mood, memory, affect and judgment normal.           Assessment:     Deviated septum  Preop          Plan:     1. Patient is approved for surgery.  2. Reviewed eating and drinking guidelines prior to surgery.  3. She has never had COVID, has received 2 COVID vaccines and will obtain COVID test per surgeon request prior to surgery.    Karla Mercado MD    ATC Kanu Stewart was present for the entire visit.

## 2021-10-14 ENCOUNTER — LAB (OUTPATIENT)
Dept: LAB | Facility: CLINIC | Age: 21
End: 2021-10-14
Payer: COMMERCIAL

## 2021-10-14 DIAGNOSIS — R53.83 FATIGUE, UNSPECIFIED TYPE: ICD-10-CM

## 2021-10-14 LAB
FERRITIN SERPL-MCNC: 30 NG/ML (ref 12–150)
HGB BLD-MCNC: 15.5 G/DL (ref 11.7–15.7)

## 2021-10-14 PROCEDURE — 85018 HEMOGLOBIN: CPT

## 2021-10-14 PROCEDURE — 82728 ASSAY OF FERRITIN: CPT

## 2021-10-20 ENCOUNTER — LAB REQUISITION (OUTPATIENT)
Dept: LAB | Facility: CLINIC | Age: 21
End: 2021-10-20
Payer: COMMERCIAL

## 2021-10-20 PROCEDURE — 88304 TISSUE EXAM BY PATHOLOGIST: CPT | Mod: TC,ORL | Performed by: OTOLARYNGOLOGY

## 2021-10-20 PROCEDURE — 88311 DECALCIFY TISSUE: CPT | Mod: TC,ORL | Performed by: OTOLARYNGOLOGY

## 2021-10-20 NOTE — PROGRESS NOTES
[facility] ATHLETIC MEDICINE  [facility]   Sport Psychology Progress Note      Location of Visit: Encounter was conducted via telehealth. Barbara reports that her physical location is: [address]  Date of Visit: September 27, 2021  Duration of Session: 45 minutes    Suicide Assessment:  Barbara denies current urges to self-harm, suicidal ideation, means, plan, or intent.    Mental Status & Observations:  Barbara appeared generally alert and oriented. Dress was appropriate to the weather and occasion. Grooming and hygiene were appropriate. Eye contact was good. Speech was of normal volume and normal. Mood was appropriate with congruent affect. Thought processes were relevant, logical and goal-directed. Thought content was within normal limits with no evidence of psychotic or paranoid features. Memory appeared intact. Insight and judgment appeared age appropriate with good focus in session.  She exhibited normal motor activity during the appointment.  Behavior was cooperative and engaging.      Observations and response to counseling:  Barbara arrived to session on time and was actively engaged throughout.    Barbara presented as cooperative and candid throughout and expressed affect and mood that was congruent to content discussed.    Barbara continues to be engaged in sport psych sessions and explores content related to stated goals.     Intervention:  Empathetic listening and supportive counseling provided throughout.     Discussed feeling tired from sport participation, academics responsibilities, and social activities. Barbara noted having increased engagement in social events with friends and talked about the impact this has had on her schedule and resulting feelings of overwhelm.    Continued supportive/therapeutic conversation by discussing the topic of unconscious bias (which was brought up during class) and how this shows up in her relationship with her mother.    Barbara was able to  "identify and reflect on messages/ideas that she learned from her parents that she now is unlearning.    Reviewed time management strategies, encouraged prioritization of self care, and discussed upcoming transition out of sport.    Discussed plan for post-grad. Barbara does not plan to use a fifth year and instead is interested in joining the [employer].    Barbara noted that she plans to \"pause\" more and be present and has a goal of reading 20 books by the end of the year.    Therapy objectives/goals:  Increase self-awareness  Increase willingness to be vulnerable and experience emotions  Improve and develop time-management skills  Provide support  Teach and improve coping skills    Therapy follow-up plan:  Individuals counseling sessions as needed      Selma Le PsyD  "

## 2021-10-25 LAB
PATH REPORT.COMMENTS IMP SPEC: NORMAL
PATH REPORT.COMMENTS IMP SPEC: NORMAL
PATH REPORT.FINAL DX SPEC: NORMAL
PATH REPORT.GROSS SPEC: NORMAL
PATH REPORT.MICROSCOPIC SPEC OTHER STN: NORMAL
PATH REPORT.RELEVANT HX SPEC: NORMAL
PHOTO IMAGE: NORMAL

## 2021-10-25 PROCEDURE — 88311 DECALCIFY TISSUE: CPT | Mod: 26

## 2021-10-25 PROCEDURE — 88304 TISSUE EXAM BY PATHOLOGIST: CPT | Mod: 26

## 2021-10-25 NOTE — PROGRESS NOTES
[facility] ATHLETIC MEDICINE  [facility]   Sport Psychology Progress Note      Location of Visit: Encounter was conducted via telehealth. Barbara reports that her physical location is: [address]  Date of Visit: October 11, 2021  Duration of Session: 45 minutes    Suicide Assessment:  Barbara denies current urges to self-harm, suicidal ideation, means, plan, or intent.    Mental Status & Observations:  Barbara appeared generally alert and oriented. Dress was appropriate to the weather and occasion. Grooming and hygiene were appropriate. Eye contact was good. Speech was of normal volume and normal. Mood was appropriate with congruent affect. Thought processes were relevant, logical and goal-directed. Thought content was within normal limits with no evidence of psychotic or paranoid features. Memory appeared intact. Insight and judgment appeared age appropriate with good focus in session.  She exhibited normal motor activity during the appointment.  Behavior was cooperative and engaging.      Observations and response to counseling:  Barbara arrived to session on time and was actively engaged throughout. Barbara presented as cooperative and candid throughout and expressed affect and mood that was congruent to content discussed.    Barbara continues to be engaged in sport psych sessions and explores content related to stated goals.     Intervention:  Empathetic listening and supportive counseling provided throughout.     Discussed plan for upcoming sinus surgery. Noted increased feelings of overwhelm related to academic tasks and planning for Spring semester. Barbaar noted intent to take 17 credits in the Spring and a full Summer course load. Explored ways to improve time management and prioritization of self care.    Explored and processed patterns in romantic relationships. Discussed changes in romantic interests. Noted attraction to individuals who attach quickly and strongly to her.  Explored what this may also say about her own attachment styles.    Encouraged engagement in self compassion and loving kindness strategies.    Therapy objectives/goals:  Increase self-awareness  Increase willingness to be vulnerable and experience emotions  Improve and develop time-management skills  Provide support  Teach and improve coping skills    Therapy follow-up plan:  Individuals counseling sessions as needed      Selma Le PsyD

## 2022-01-03 DIAGNOSIS — R53.83 FATIGUE, UNSPECIFIED TYPE: Primary | ICD-10-CM

## 2022-01-06 ENCOUNTER — LAB (OUTPATIENT)
Dept: LAB | Facility: CLINIC | Age: 22
End: 2022-01-06
Payer: COMMERCIAL

## 2022-01-06 DIAGNOSIS — R53.83 FATIGUE, UNSPECIFIED TYPE: ICD-10-CM

## 2022-01-06 LAB
FERRITIN SERPL-MCNC: 33 NG/ML (ref 12–150)
HGB BLD-MCNC: 14.4 G/DL (ref 11.7–15.7)

## 2022-01-06 PROCEDURE — 82728 ASSAY OF FERRITIN: CPT

## 2022-01-06 PROCEDURE — 85018 HEMOGLOBIN: CPT

## 2022-01-09 ENCOUNTER — DOCUMENTATION ONLY (OUTPATIENT)
Dept: FAMILY MEDICINE | Facility: CLINIC | Age: 22
End: 2022-01-09
Payer: COMMERCIAL

## 2022-01-09 NOTE — PROGRESS NOTES
[facility] ATHLETIC MEDICINE  [facility]   Sport Psychology Progress Note      Location of Visit: Encounter was conducted via telehealth. Barbara reports that her physical location is: [address]  Date of Visit: January 9, 2022  Duration of Session: 45 minutes    Suicide Assessment:  Barbara denies current urges to self-harm, suicidal ideation, means, plan, or intent.    Mental Status & Observations:  Barbara appeared generally alert and oriented. Dress was appropriate to the weather and occasion. Grooming and hygiene were appropriate. Eye contact was good. Speech was of normal volume and normal. Mood was appropriate with congruent affect. Thought processes were relevant, logical and goal-directed. Thought content was within normal limits with no evidence of psychotic or paranoid features. Memory appeared intact. Insight and judgment appeared age appropriate with good focus in session.  She exhibited normal motor activity during the appointment.  Behavior was cooperative.     Observations and response to counseling:  Reports that she enjoyed winter break with friends and family. Reports continued exploration of feelings, interpersonal relationship dynamics, and use of effective communication strategies.    Intervention:  Empathetic listening and supportive counseling provided throughout. Explored and processed emotions toward romantic relationship. Aided in identifying patterns in romantic relationships. Praised Barbara's ability to communicate needs and create necessary emotional space to process feelings. Discussed sport performance, upcoming competition, and strategies to reduce feelings of burn out in sport.    Therapy objectives/goals:  Increase self-awareness  Increase willingness to be vulnerable and experience emotions  Improve and develop time-management skills  Provide support  Teach and improve coping skills    Therapy follow-up plan:  Individuals counseling sessions as  needed      Selma Le PsyD

## 2022-01-18 ENCOUNTER — APPOINTMENT (OUTPATIENT)
Dept: LAB | Facility: CLINIC | Age: 22
End: 2022-01-18
Payer: COMMERCIAL

## 2022-01-18 ENCOUNTER — OFFICE VISIT (OUTPATIENT)
Dept: FAMILY MEDICINE | Facility: CLINIC | Age: 22
End: 2022-01-18
Payer: COMMERCIAL

## 2022-01-18 VITALS
WEIGHT: 145 LBS | BODY MASS INDEX: 21.48 KG/M2 | DIASTOLIC BLOOD PRESSURE: 88 MMHG | HEART RATE: 88 BPM | SYSTOLIC BLOOD PRESSURE: 123 MMHG | HEIGHT: 69 IN

## 2022-01-18 DIAGNOSIS — U07.1 CLINICAL DIAGNOSIS OF COVID-19: ICD-10-CM

## 2022-01-18 DIAGNOSIS — U07.1 INFECTION DUE TO 2019 NOVEL CORONAVIRUS: Primary | ICD-10-CM

## 2022-01-18 LAB — TROPONIN I SERPL HS-MCNC: 4 NG/L

## 2022-01-18 PROCEDURE — 84484 ASSAY OF TROPONIN QUANT: CPT | Performed by: PATHOLOGY

## 2022-01-18 PROCEDURE — 36415 COLL VENOUS BLD VENIPUNCTURE: CPT | Performed by: PATHOLOGY

## 2022-01-18 ASSESSMENT — MIFFLIN-ST. JEOR: SCORE: 1487.1

## 2022-01-18 NOTE — PROGRESS NOTES
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    -Dx last Tuesday, Jan 11, 2022  -Sick on Monday Derick 10  -Symptoms:  Head  Cold symptoms, congested, HA, fatigued, ? Body aches  -No runny nose or ST  -Mild SOB, no CP, no palpitations  -Felt back to normal on Jan 16, 2022  -Vaccinated Moderna but no booster yet.  Booster eligible  -Unknown source of infection  -Took Tylenol for a couple of day  -NO asthma    Current Outpatient Medications   Medication     aspirin-acetaminophen-caffeine (EXCEDRIN MIGRAINE) 250-250-65 MG tablet     levonorgestrel (MIRENA) 20 MCG/24HR IUD     No current facility-administered medications for this visit.     O:  NAD  /88   Pulse 88   Ht 1.753 m (5' 9\")   Wt 65.8 kg (145 lb)   LMP 12/25/2021   BMI 21.41 kg/m    HEENT:  Neg  Lungs; cta  Heart; rrr  Ext: no edema    A:  S/p COVID 19 infection with trop  WNL and EKG pending     P: Ok to advance to bike test with ATC once I review the EKG when it is available.  If she passes that then advance as tolerated to full activity.     Kanu Stewart ATC was present for the entire appt.     Teagan Patrick MD, CAQ, FACSM, CCD  [facility]  Sports Medicine and Bone Health  Team Physician;  Athletics      I have reviewed the EKG and it is unchanged compared to her previous EKG.  She also had a normal Echo at that same time.      Sinus rhythm   Septal infarct (cited on or before 18-JAN-2022)   Abnormal ECG   When compared with ECG of 29-JUN-2021 15:13,   No significant change was found     Cleared to do the bike test as listed above.      Kanu Stewart ATC was notified of these findings and plan.      Teagan Patrick MD, JASWANT, FACSM, CCD  [facility]  Sports Medicine and Bone Health  Team Physician;  Athletics    "

## 2022-01-18 NOTE — LETTER
Date:January 20, 2022      Patient was self referred, no letter generated. Do not send.        [facility] Health Information

## 2022-01-18 NOTE — LETTER
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    -Dx last Tuesday, Jan 11, 2022  -Sick on Monday Derick 10  -Symptoms:  Head  Cold symptoms, congested, HA, fatigued, ? Body aches  -No runny nose or ST  -Mild SOB, no CP, no palpitations  -Felt back to normal on Jan 16, 2022  -Vaccinated Moderna but no booster yet.  Booster eligible  -Unknown source of infection  -Took Tylenol for a couple of day  -NO asthma    Current Outpatient Medications   Medication     aspirin-acetaminophen-caffeine (EXCEDRIN MIGRAINE) 250-250-65 MG tablet     levonorgestrel (MIRENA) 20 MCG/24HR IUD     No current facility-administered medications for this visit.     O:  NAD  /88   Pulse 88   Ht 1.753 m (5' 9\")   Wt 65.8 kg (145 lb)   LMP 12/25/2021   BMI 21.41 kg/m    HEENT:  Neg  Lungs; cta  Heart; rrr  Ext: no edema    A:  S/p COVID 19 infection with trop  WNL and EKG pending     P: Ok to advance to bike test with ATC once I review the EKG when it is available.  If she passes that then advance as tolerated to full activity.     Kanu Stewart ATC was present for the entire appt.     Teagan Patrick MD, CAQ, FACSM, CCD  [facility]  Sports Medicine and Bone Health  Team Physician;  Athletics      I have reviewed the EKG and it is unchanged compared to her previous EKG.  She also had a normal Echo at that same time.      Sinus rhythm   Septal infarct (cited on or before 18-JAN-2022)   Abnormal ECG   When compared with ECG of 29-JUN-2021 15:13,   No significant change was found     Cleared to do the bike test as listed above.      Kanu Stewart ATC was notified of these findings and plan.      Teagan Patrick MD, CAQ, FACSM, CCD  [facility]  Sports Medicine and Bone Health  Team Physician;  Athletics        Teagan Patrick MD    "

## 2022-01-19 LAB
ATRIAL RATE - MUSE: 64 BPM
DIASTOLIC BLOOD PRESSURE - MUSE: NORMAL MMHG
INTERPRETATION ECG - MUSE: NORMAL
P AXIS - MUSE: 76 DEGREES
PR INTERVAL - MUSE: 136 MS
QRS DURATION - MUSE: 80 MS
QT - MUSE: 402 MS
QTC - MUSE: 414 MS
R AXIS - MUSE: 89 DEGREES
SYSTOLIC BLOOD PRESSURE - MUSE: NORMAL MMHG
T AXIS - MUSE: 60 DEGREES
VENTRICULAR RATE- MUSE: 64 BPM

## 2022-02-17 ENCOUNTER — DOCUMENTATION ONLY (OUTPATIENT)
Dept: FAMILY MEDICINE | Facility: CLINIC | Age: 22
End: 2022-02-17
Payer: COMMERCIAL

## 2022-02-19 ENCOUNTER — DOCUMENTATION ONLY (OUTPATIENT)
Dept: FAMILY MEDICINE | Facility: CLINIC | Age: 22
End: 2022-02-19
Payer: COMMERCIAL

## 2022-02-21 NOTE — PROGRESS NOTES
[facility] ATHLETIC MEDICINE  [facility]   Sport Psychology Progress Note      Location of Visit: Encounter was conducted via telehealth. Barbara reports that her physical location is: [address]  Date of Visit: February 17, 2022  Duration of Session: 45-50 minutes    Suicide Assessment:  Barbara denies current urges to self-harm, suicidal ideation, means, plan, or intent.    Mental Status & Observations:  Barbara appeared generally alert and oriented. Dress was appropriate to the weather and occasion. Grooming and hygiene were appropriate. Eye contact was good. Speech was of normal volume and normal. Mood was tearful. Thought processes were relevant, logical and goal-directed. Thought content was within normal limits with no evidence of psychotic or paranoid features. Memory appeared intact. Insight and judgment appeared age appropriate with good focus in session.  She exhibited normal motor activity during the appointment.  Behavior was cooperative.     Observations and response to counseling:  Barbara arrived to session on time and was an active participant throughout session. She became tearful at the start of session and shared numerous life stressors. Reports return to sport activity after injury and familial concerns.    Intervention:  Empathetic listening and supportive counseling provided throughout. Co-created therapeutic space to explore and process emotions related to dynamics with members of her family resulting in negative feelings. Noted plan to engage in ADHD testing and her father's unsupportive reactions to this. Noted frustration with her sport performance and communication conflicts with her . Also noted that her eighteen year old sister has been having issues and is getting into trouble. Aided in helping Barbara to prioritize her needs and explore emotions. Identified personal and sport goals and strategies to manage stress and regulate emotions.    Therapy  objectives/goals:  Increase self-awareness  Increase willingness to be vulnerable and experience emotions  Improve and develop time-management skills  Provide support  Teach and improve coping skills    Therapy follow-up plan:  Individuals counseling sessions as needed      Selma Le PsyD

## 2022-02-22 ENCOUNTER — DOCUMENTATION ONLY (OUTPATIENT)
Dept: FAMILY MEDICINE | Facility: CLINIC | Age: 22
End: 2022-02-22
Payer: COMMERCIAL

## 2022-02-22 NOTE — PROGRESS NOTES
I have reviewed and agree with the document of this note.  I did not personally see the patient.    Mehul Herrera, PhD, LP

## 2022-03-17 ENCOUNTER — DOCUMENTATION ONLY (OUTPATIENT)
Dept: FAMILY MEDICINE | Facility: CLINIC | Age: 22
End: 2022-03-17
Payer: COMMERCIAL

## 2022-03-17 NOTE — PROGRESS NOTES
[facility] ATHLETIC MEDICINE  [facility]   Sport Psychology Note: ADHD/LD Testing        Location of Visit: [facility]  Date of Visit: 2/22/22  Duration of Session: 120 minutes  Referred by: Sport Psychology     Barbara is a 21 year old  cisgender female.  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 She was referred to this writer for a psychological assessment to determine the potential presence of ADHD (d/t recent concerns with attention, concentration, and task initiation).     Presenting Problems/Symptoms: (self-report)  Anxiety/worry  Concentration/attention  Distracting thoughts     Mental Status & Observations:  Barbara appeared generally alert and oriented. Dress was appropriate to the weather and occasion. Grooming and hygiene were appropriate. Eye contact was good. Speech was of normal volume and normal. Mood was appropriate with congruent affect. Thought processes were relevant, logical and goal-directed. Thought content was within normal limits with no evidence of psychotic or paranoid features. Memory appeared intact. Insight and judgment appeared age appropriate with good focus in session.  She exhibited elevated motor activity during the appointment.  Behavior was cooperative.       Intervention:  Barbara arrived early for testing session. We continued the testing process that was started last week. Test administration included the BRIEF-A and the 10 core subtests of the WAIS.     Follow-up plan: phone conversation with parent(s), feedback session     JESSICA PEDERSON, PhD LP

## 2022-03-17 NOTE — PROGRESS NOTES
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 Disorder 7-item (CHERRIE-7) is a brief self-report scale that is designed to measure symptoms of anxiety based on diagnostic criteria. Barbara s overall score on this instrument was nine, indicating mild to moderate levels of self-reported anxiety. Symptoms included feeling on edge, having difficulty controlling worry, having trouble relaxing, being so restless it is hard to sit still, and irritability. She reported that these symptoms have been very difficult to manage.    The Personality Assessment Inventory is a comprehensive self-report instrument that examines psychological functioning in adults. The SHELBY provides validity indices designed to provide an assessment of factors that could distort the results of testing. Barbara s scores on the validity scales suggest she responded with honesty and took care in responding to items.     Barbara s SHELBY clinical profile displayed no marked elevations that are indicative of clinical pathology. Scores on some of the subscales and supplementary scores, however, show moderate elevations that may reflect sources of difficulty for Barbara. These elevations are discussed below.    Barbara s report suggests she experiences prominent levels of worry and concern about daily issues; such concern affects her ability to concentrate, and her attention is compromised.  Respondents with scores like Barbara christensen are likely to have peers and colleagues  comment about being overly concerned regarding events or circumstances over which she has no control. Such symptoms are reflective of cognitive patterns of anxiety.     In addition to cognitive feelings of anxiety, Barbara endorsed thought processes marked by confusion, distractibility, and difficulty concentrating. Barbara may also have problems communicating clearly with others due to tangential or circumstantial thinking and speaking patterns. The INATTN index, which is an experimental index designed to assess for ADHD-like symptomatology in adults, was clinically elevated (t = 77). This index includes SHELBY scales and subscales known to differentiate clients who are eventually diagnosed with ADHD, including but not limited to MAN-A, SCZ-T, ANX-C, all of which were elevated for Barbara.    Barbara also endorsed items to suggest she sees herself as soliman; others may see her as overly sensitive. Responses suggest she may also be dissatisfied with some of her more important relationships and uncertain about some of her major life goals.    In terms of self-concept, Barbara christensen profile suggests she is a confident, resilient, and optimistic person, although her self-esteem may be reactive to changes in circumstances. When feeling stressed, Barbara may be more troubled by self-doubt and misgivings than what she reveals to others.    Interpersonally, Barbara is warm, friendly, and sympathetic toward others. She is likely to value meaningful and authentic relationships. Because of the premium placed on harmony with others, she is uncomfortable with conflict or confrontation and will avoid tense or conflictual situations when possible. She may also have difficulty asserting herself or standing up for herself when needed.    Considering the scores and interpersonal patterns noted above, Barbara christensen profile did not reveal difficulties associated with phobias, paranoia,  aggression, suicidality, or alcohol use. Barbara s response indicates that her level of stress is about average in comparison to other college students and young adults. 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         Conclusions and Diagnostic Impression     Barbara s overall levels of intellectual functioning and academic functioning were estimated to be in the average range and revealed no evidence of learning disabilities. Her performance on the intellectual functioning subtests revealed no notable deficits, though her working memory is a relative weakness and her processing speed is a relative strength when compared to other domains of intellectual functioning.    Self-ratings of childhood symptoms revealed many symptoms associated with Attention Deficit Hyperactivity Disorder. Observer ratings from childhood and adulthood also revealed symptoms that contribute to a decrease in functioning. On measures related to mental health functioning, objective scores suggest Barbara experiences anxious thinking patterns and difficulties with thinking and concentrating. On measures related to behavioral, attentional, and intellectual functioning, results were mixed regarding deficits pertaining to attention and concentration. Barbara performed within the average range in some areas, while she performed below average in other areas. Much of the information described in the clinical interview reflected the presence of ADHD symptoms, though such symptoms were not outwardly apparent to parents when Barbara was younger.    Findings suggest Barbara currently displays the following diagnostic symptoms:  1. Often has difficulty sustaining attention in tasks or activities (e.g., has difficulty remaining focused during lectures, conversations, or lengthy reading).  2. Often has difficulty organizing tasks and activities (e.g., difficulty managing  sequential tasks; difficulty keeping materials and belongings in order; messy, disorganized work; has poor time management; fails to meet deadlines).  3. Loses things necessary for tasks or activities (e.g., wallets, keys, identification cards)  4. Is often easily distracted by extraneous stimuli (for adults, this may include unrelated thoughts).  5. Is often forgetful in daily activities (e.g., doing chores, running errands; for older adolescents and adults, returning calls, paying bills, keeping appointments).    Findings suggest some of the above symptoms appeared before the age of twelve, in addition to some childhood symptoms of hyperactivity. Findings also indicate that the symptoms interfere with, or reduce the quality of, social and academic functioning. Although there was some variability between objective and subjective ADHD measures, scholars have noted that women and girls with ADHD are more likely to internalize symptoms, making them more difficult to [id number] FRANK Cortez., & Ro, K. N. (2009). Attention-Deficit/Hyperactivity Disorder. In The Neuropsychology of Women. Santana. Doi: 10.1007/420-4-221-36300-5.5. Girls and women with ADHD are also likely to have better coping strategies than males, which allows them to mask and mitigate their symptoms.22 Daryn, PPenelope O., & CHARBEL Redmond (2014). A review of Attention-Deficit/Hyperactivity Disorder in women and girls: Uncovering this hidden diagnosis. [facility] CNS Disor, 16. doi: 10.4088/[facility].[email or web address]  Some of Barbara s core cognitive findings are also consistent with an ADHD presentation, including weaknesses in working memory when compared to other areas of intelligence.33 FRANK Cortez., & Ro, K. N. (2009). Attention-Deficit/Hyperactivity Disorder. In The Neuropsychology of Women. Santana. Doi: 10.1007/207-6-146-92864-6.5  For Barbara to meet full DSM-5 criteria for an ADHD diagnosis, credible evidence must exist that she experienced six or  more symptoms of ADHD in each domain in early childhood and continues to experience five or more symptoms as an adult44 American Psychiatric Association. (2013). Diagnostic and statistical manual of mental disorders (5th ed.). LESLIE Castellanos: Author.. Such symptoms must also contribute to substantial impairment across settings by the middle school years55 LARA Cardoso (2010). Attention Deficit Hyperactivity Disorder in Adults: The latest assessment and treatment strategies. DEBRA Mejia:  [employer].. Because six or more symptoms in one domain were not clearly present prior to the age of 12, the following diagnosis is warranted: Other Specified Attention-Deficit/Hyperactivity Disorder - insufficient symptoms present prior to age 12 (314.01; F90.8).    DSM-5 Diagnosis:   Other Specified Attention Deficit Hyperactivity Disorder, insufficient symptoms present prior to age 12 (314.01; F90.8)    Recommendations for Barbara can be found below.    Recommendations      The following recommendations are offered for Barbara. Such recommendations are based on Barbara s testing results, as well as conversation with Barbara during the feedback session:    1. 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     2. Barbara is encouraged to continue to work with Dr. Selma Le in sport psychology to navigate academic stressors and concerns, as well as the pressures of being a student athlete.   a. Individual counseling can also provide additional strategies for managing difficulties with attention and restlessness.    3. Based on current test results, information gathered from the clinical interview, and behavior observations made during testing, a diagnosis of Other Specified ADHD is supported.   a. It is recommended that Barbara consult with her team physician, Dr. Mercado, to discuss the  potential benefits of medication.    4. Barbara may benefit from consulting with the [facility] s Office of Disability Center to learn what accommodations might be available to her. Recommended accommodations may include, but not limited to:   a. Extended time on tests and assignments.   b. Testing in a separate and quiet place and/or being allowed to test over several sessions.  c. Permission to record lectures.  d. Audio-taped textbook and/or reading assistance service (i.e., reading software).  e. Assistance writing class notes or access to a peer notetaker  f. Written instructions from professors for assignments that require multiple steps.    5. To help maintain her attention during lengthy tasks, Barbara may find the following strategies helpful:  a. Study in a distraction-free environment with regularly scheduled breaks. Sit at a desk that is clear of all other material except for the one thing she is working on at the moment to reduce distractibility.  b. Consider using the Mobile Armor tuan, the Pomodoro technique (e.g., FocusBooster, BeFocused), or browser blockers (e.g., SelfControl, StayFocused).  c. Try using white noise to drown out the background sounds and environmental noises that may draw her off task.   d. Sit toward the front and center of the classroom during class lectures.   e. Use a planner to have a visual reminder of what tasks need to be completed.     6. Barbara is encouraged to consider the following strategies to enhance organization and time management:   a. Create and stick to daily goals for completing homework assignments, chores, and other responsibilities. She should set reasonable time allotments for each task and use checklists to keep track of multiple steps and task completion.   b. Establish clear starting points for tasks rather than just relying on a due date.   c. Break down big assignments or projects into smaller pieces with more deadlines.  d. Use a reward system  for meeting goals/task completion (i.e., go out to eat or do a fun activity after completing a big homework assignment).   e. She is encouraged to ask questions (i.e., in-person, e-mail) and write instructions down whenever possible to ensure that he fully understands and can remember instructions.   f. Reduce distractions by using a blank piece of paper to cover all but one of the questions on a worksheet or test. This strategy can also be used during reading assignments.   g. Allow time for breaks to refresh and refocus.  h. She may wish to use alarm/timer to help with scheduling time for completing tasks.    7. Finally, Barbara is encouraged to work on sleep hygiene to create a more consistent sleep schedule with sleep patterns that will support her mental health and performance. Such behaviors may include but are not limited to:    a. Go to sleep and wake up at the same time each night and day  b. Use an alarm clock other than the phone and keeping the phone out of the immediate vicinity to prevent distractions  c. Keep her room to a cool temperature  d. Use black-out curtains to reduce ambient lighting from streetlights or building lights in her area  e. Refrain from caffeine use in the afternoon and evening.   f. Avoid heavy or spicy meals shortly before bedtime                 Julia Donaldson, Ph.D., , [facility]  Licensed Psychologist ([id number])  [facility], [facility] & [facility]              Table 1: BRIEF-A Self-Report Form  Scale T score 90% CI Percentile  Description   Inhibit 74 66 - 82 98 Clinically Elevated   Shift 56 48 - 64 77 Average   Emotional Control 60 55 - 65 86 Average   Self-Monitor 63 55 - 71 92 Slightly Elevated   Initiate 56 48 - 64 72 Average   Working Memory 79 72 - 86 99 Clinically Elevated   Plan/Organize 54 49 - 61 69 Average   Task Monitor 77 68 - 108 >99 Clinically Elevated   Organization of Materials 69 63 - 103 97 Clinically Elevated   Behavior  Regulation Index 66 62 - 70 92 Clinically Elevated   Metacognition Index 69 65 - 73 95 Clinically Elevated   Global Executive Composite 69 63 - 75 95 Clinically Elevated       Table 2a: WAIS- IV Composite Results    IQ/Index Scale IQ/Index Score 95% Confidence Interval Percentile  Description   Verbal Comprehension 107 101 - 112 68th  Average   Perceptual Reasoning 111 104 - 117 77th  High Average   Working Memory 92 86 - 99 30th  Average   Processing Speed 132 120 - 137 98th  Very Superior     Table 2b: WAIS- IV Subtest Summary Results     Verbal Comprehension Subtests Scaled Score Working Memory Subtests Scaled Score   Similarities (SI) 14 Digit Span (DS) 7   Vocabulary (VC) 11 Arithmetic (AR) 10   Information (IN) 9     Perceptual Reasoning Subtests  Processing Speed Subtests    Block Design (BD) 17 Symbol Search (SS) 19   Matrix Reasoning (MR) 9 Coding (CD) 13   Visual Puzzles () 10         Table 3: Wide Range Achievement Test (WRAT 4) Results  Scale Standard Score 95% Confidence Interval Percentile Interpretive Guideline   Word Reading 103 95 - 111 58 Average   Sentence Comprehension 99 91 - 107 47 Average   Spelling 106 97 - 115 66 Average   Math Computation 96 86 - 106 39 Average   Reading Composite 100 94 - 106 50 Average           Table 4: CPT-III Results    Subscale T-Score Description   Detectability 67 Elevated   Omissions 69 Elevated   Commissions 64 Elevated   Perseverations 48 Average   HRT 54 Average   HRT SD 53 Average   Variability 49 Average   HRT Block Change 55 Average   HRT STEVEN Change 54 Average

## 2022-03-17 NOTE — PROGRESS NOTES
[facility] ATHLETIC MEDICINE  [facility]   Sport Psychology Note: ADHD/LD Testing        Location of Visit: [facility]  Date of Visit: 2/19/22  Duration of Session: 150 minutes  Referred by: Sport Psychology     Barbara is a 21 year old  cisgender female.  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 She was referred to this writer for a psychological assessment to determine the potential presence of ADHD (d/t recent concerns with attention, concentration, and task initiation).     Presenting Problems/Symptoms: (self-report)  Anxiety/worry  Concentration/attention  Distracting thoughts     Mental Status & Observations:  Barbara appeared generally alert and oriented. Dress was appropriate to the weather and occasion. Grooming and hygiene were appropriate. Eye contact was good. Speech was of normal volume and normal. Mood was appropriate with congruent affect. Thought processes were relevant, logical and goal-directed. Thought content was within normal limits with no evidence of psychotic or paranoid features. Memory appeared intact. Insight and judgment appeared age appropriate with good focus in session.  She exhibited elevated motor activity during the appointment.  Behavior was cooperative.       Intervention:  Barbara arrived early for testing session. Provided information pertaining to ADHD/LD testing, including the informed consent, service agreement, and release of information. She completed paperwork prior to the appointment and signed a VIMAL for her parents. This writer conducted clinical interview and administered the following tests: WRAT-4, BTA, Trails-X, and CPT. Testing will continue next week.     Follow-up plan: second testing session, complete SHELBY remotely, phone conversation with parent(s)     JESSICA PEDERSON, PhD LP

## 2022-03-18 ENCOUNTER — OFFICE VISIT (OUTPATIENT)
Dept: FAMILY MEDICINE | Facility: CLINIC | Age: 22
End: 2022-03-18
Payer: COMMERCIAL

## 2022-03-18 VITALS
WEIGHT: 145 LBS | DIASTOLIC BLOOD PRESSURE: 83 MMHG | HEIGHT: 69 IN | SYSTOLIC BLOOD PRESSURE: 119 MMHG | BODY MASS INDEX: 21.48 KG/M2 | HEART RATE: 80 BPM

## 2022-03-18 DIAGNOSIS — F90.9 ATTENTION DEFICIT HYPERACTIVITY DISORDER (ADHD), UNSPECIFIED ADHD TYPE: Primary | ICD-10-CM

## 2022-03-18 RX ORDER — DEXTROAMPHETAMINE SACCHARATE, AMPHETAMINE ASPARTATE MONOHYDRATE, DEXTROAMPHETAMINE SULFATE AND AMPHETAMINE SULFATE 3.75; 3.75; 3.75; 3.75 MG/1; MG/1; MG/1; MG/1
15 CAPSULE, EXTENDED RELEASE ORAL DAILY
Qty: 30 CAPSULE | Refills: 0 | Status: SHIPPED | OUTPATIENT
Start: 2022-03-18

## 2022-03-18 NOTE — LETTER
3/18/2022      RE: Barbara Olivera  [address]  [place] [address]       HISTORY OF PRESENT ILLNESS  Ms. Olivera is a pleasant 21 year old year old female who presents to review her ADHD screening that was positive  Has some anxiety as well  Wants to discuss start of adderall  Has trouble focusing and paying attention, which exacerbates her anxiety  No HI or SI   Has been doing some counseling which is helping    MEDICAL HISTORY  Patient Active Problem List   Diagnosis     Pain in joint, lower leg       Current Outpatient Medications   Medication Sig Dispense Refill     aspirin-acetaminophen-caffeine (EXCEDRIN MIGRAINE) 250-250-65 MG tablet Take 1 tablet by mouth every 6 hours as needed for headaches       levonorgestrel (MIRENA) 20 MCG/24HR IUD 1 each by Intrauterine route once         Allergies   Allergen Reactions     Seasonal Allergies        Family History   Problem Relation Age of Onset     Lung Cancer Maternal Grandfather      Ovarian Cancer Paternal Grandmother      Autism Spectrum Disorder Brother      Social History     Socioeconomic History     Marital status: Single     Spouse name: Not on file     Number of children: Not on file     Years of education: Not on file     Highest education level: Not on file   Occupational History     Not on file   Tobacco Use     Smoking status: Never Smoker     Smokeless tobacco: Never Used   Substance and Sexual Activity     Alcohol use: No     Drug use: No     Sexual activity: Never   Other Topics Concern     Not on file   Social History Narrative     Not on file     Social Determinants of Health     Financial Resource Strain: Not on file   Food Insecurity: Not on file   Transportation Needs: Not on file   Physical Activity: Not on file   Stress: Not on file   Social Connections: Not on file   Intimate Partner Violence: Not on file   Housing Stability: Not on file       Additional medical/Social/Surgical histories reviewed in [facility] and updated as appropriate.    "  REVIEW OF SYSTEMS (3/18/2022)  10 point ROS of systems including Constitutional, Eyes, Respiratory, Cardiovascular, Gastroenterology, Genitourinary, Integumentary, Musculoskeletal, Psychiatric, Allergic/Immunologic were all negative except for pertinent positives noted in my HPI.     PHYSICAL EXAM  Vitals:    03/18/22 0942   BP: 119/83   Pulse: 80   Weight: 65.8 kg (145 lb)   Height: 1.753 m (5' 9\")     Exam:  Constitutional: healthy, alert and no distress  Head: Normocephalic. No masses, lesions, tenderness or abnormalities  Neck: Neck supple. No adenopathy. Thyroid symmetric, normal size,, Carotids without bruits.  ENT: ENT exam normal, no neck nodes or sinus tenderness  Cardiovascular: negative, PMI normal. No lifts, heaves, or thrills. RRR. No murmurs, clicks gallops or rub  Respiratory: negative, Percussion normal. Good diaphragmatic excursion. Lungs clear  Gastrointestinal: Abdomen soft, non-tender. BS normal. No masses, organomegaly  Skin: no suspicious lesions or rashes  Neurologic: Gait normal. Reflexes normal and symmetric. Sensation grossly WNL.  Psychiatric: mentation appears normal and affect normal/bright  Hematologic/Lymphatic/Immunologic: Normal cervical lymph nodes  ASSESSMENT & PLAN  22 yo female with ADHD, anxiety    I independently reviewed her ADHD testing  Discussed and given RX for adderall XR 15  F/u in 2 weeks  Cont. Counseling  Will consider anxiety treatment with medications as well      Appropriate PPE was utilized for prevention of spread of Covid-19.  Hi Cruz MD, [facility]        Hi Cruz MD    "

## 2022-03-18 NOTE — LETTER
Date:March 29, 2022      Patient was self referred, no letter generated. Do not send.        [facility] Health Information

## 2022-03-18 NOTE — PROGRESS NOTES
HISTORY OF PRESENT ILLNESS  Ms. lOivera is a pleasant 21 year old year old female who presents to review her ADHD screening that was positive  Has some anxiety as well  Wants to discuss start of adderall  Has trouble focusing and paying attention, which exacerbates her anxiety  No HI or SI   Has been doing some counseling which is helping    MEDICAL HISTORY  Patient Active Problem List   Diagnosis     Pain in joint, lower leg       Current Outpatient Medications   Medication Sig Dispense Refill     aspirin-acetaminophen-caffeine (EXCEDRIN MIGRAINE) 250-250-65 MG tablet Take 1 tablet by mouth every 6 hours as needed for headaches       levonorgestrel (MIRENA) 20 MCG/24HR IUD 1 each by Intrauterine route once         Allergies   Allergen Reactions     Seasonal Allergies        Family History   Problem Relation Age of Onset     Lung Cancer Maternal Grandfather      Ovarian Cancer Paternal Grandmother      Autism Spectrum Disorder Brother      Social History     Socioeconomic History     Marital status: Single     Spouse name: Not on file     Number of children: Not on file     Years of education: Not on file     Highest education level: Not on file   Occupational History     Not on file   Tobacco Use     Smoking status: Never Smoker     Smokeless tobacco: Never Used   Substance and Sexual Activity     Alcohol use: No     Drug use: No     Sexual activity: Never   Other Topics Concern     Not on file   Social History Narrative     Not on file     Social Determinants of Health     Financial Resource Strain: Not on file   Food Insecurity: Not on file   Transportation Needs: Not on file   Physical Activity: Not on file   Stress: Not on file   Social Connections: Not on file   Intimate Partner Violence: Not on file   Housing Stability: Not on file       Additional medical/Social/Surgical histories reviewed in [facility] and updated as appropriate.     REVIEW OF SYSTEMS (3/18/2022)  10 point ROS of systems including Constitutional,  "Eyes, Respiratory, Cardiovascular, Gastroenterology, Genitourinary, Integumentary, Musculoskeletal, Psychiatric, Allergic/Immunologic were all negative except for pertinent positives noted in my HPI.     PHYSICAL EXAM  Vitals:    03/18/22 0942   BP: 119/83   Pulse: 80   Weight: 65.8 kg (145 lb)   Height: 1.753 m (5' 9\")     Exam:  Constitutional: healthy, alert and no distress  Head: Normocephalic. No masses, lesions, tenderness or abnormalities  Neck: Neck supple. No adenopathy. Thyroid symmetric, normal size,, Carotids without bruits.  ENT: ENT exam normal, no neck nodes or sinus tenderness  Cardiovascular: negative, PMI normal. No lifts, heaves, or thrills. RRR. No murmurs, clicks gallops or rub  Respiratory: negative, Percussion normal. Good diaphragmatic excursion. Lungs clear  Gastrointestinal: Abdomen soft, non-tender. BS normal. No masses, organomegaly  Skin: no suspicious lesions or rashes  Neurologic: Gait normal. Reflexes normal and symmetric. Sensation grossly WNL.  Psychiatric: mentation appears normal and affect normal/bright  Hematologic/Lymphatic/Immunologic: Normal cervical lymph nodes  ASSESSMENT & PLAN  22 yo female with ADHD, anxiety    I independently reviewed her ADHD testing  Discussed and given RX for adderall XR 15  F/u in 2 weeks  Cont. Counseling  Will consider anxiety treatment with medications as well      Appropriate PPE was utilized for prevention of spread of Covid-19.  Hi Cruz MD, CAQSM    "

## 2022-03-28 ENCOUNTER — DOCUMENTATION ONLY (OUTPATIENT)
Dept: FAMILY MEDICINE | Facility: CLINIC | Age: 22
End: 2022-03-28
Payer: COMMERCIAL

## 2022-03-31 NOTE — PROGRESS NOTES
[facility] ATHLETIC MEDICINE  [facility]   Sport Psychology Progress Note      Location of Visit: Encounter was conducted in-person ([facility] - Office [facility])  Date of Visit: March 28, 2022  Duration of Session: 45 minutes    Suicide Assessment:  Barbara denies current urges to self-harm, suicidal ideation, means, plan, or intent.    Mental Status & Observations:  Barbara appeared generally alert and oriented. Dress was appropriate to the weather and occasion. Grooming and hygiene were appropriate. Eye contact was good. Speech was of normal volume and normal. Mood was appropriate. Thought processes were relevant, logical and goal-directed. Thought content was within normal limits with no evidence of psychotic or paranoid features. Memory appeared intact. Insight and judgment appeared age appropriate with good focus in session.  She exhibited normal motor activity during the appointment.  Behavior was cooperative.     Observations and response to counseling:  Barbara arrived to session on time and was an active participant throughout. Barbara reports having concluded psychological testing and received a confirmatory ADHD diagnosis. Reports sense of relief and hopefulness. Noted having been prescribed medication (Adderall) to manage ADHD diagnosis. Denied unwanted effects related to starting this medication. Reports compliance with medication instructions.    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    Intervention:  Empathetic listening and supportive counseling provided throughout. Aided in discussing experience of psychological testing and reactions from parents when she shared the confirmatory diagnosis. Offered support and validated feelings related to her testing experience.     Engaged in therapeutic conversation re: sport performance and self described increasingly difficult relationship with her . Discussed strategies for Barbara to communicate  concerns with her  and her desire to compete. Explored feelings of undervalue and processed her perception of being overlooked and unwanted. Identified goals for her outdoor season and encouraged continued use of mindful self compassion, positive/affirming self dialogue, and use of social supports.    Therapy objectives/goals:  Increase self-awareness  Increase willingness to be vulnerable and experience emotions  Improve and develop time-management skills  Provide support  Teach and improve coping skills    Therapy follow-up plan:  Individuals counseling sessions bi-weekly      Selma Le PsyD

## 2022-04-04 ENCOUNTER — DOCUMENTATION ONLY (OUTPATIENT)
Dept: FAMILY MEDICINE | Facility: CLINIC | Age: 22
End: 2022-04-04
Payer: COMMERCIAL

## 2022-04-08 NOTE — PROGRESS NOTES
"[facility] ATHLETIC MEDICINE  [facility]   Sport Psychology Progress Note      Location of Visit: Encounter was conducted in-person ([facility] - Office [facility])  Date of Visit: April 4, 2022  Duration of Session: 45 minutes    Suicide Assessment:  Barbara denies current urges to self-harm, suicidal ideation, means, plan, or intent.    Mental Status & Observations:  Barbara appeared generally alert and oriented. Dress was appropriate to the weather and occasion. Grooming and hygiene were appropriate. Eye contact was good. Speech was of normal volume and normal. Mood was appropriate. Thought processes were relevant, logical and goal-directed. Thought content was within normal limits with no evidence of psychotic or paranoid features. Memory appeared intact. Insight and judgment appeared age appropriate with good focus in session.  She exhibited normal motor activity during the appointment.  Behavior was cooperative.     Observations and response to counseling:  Barbara arrived to session on time and was an active participant throughout. 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 Reports that despite this her relationship with her  continues to be strained and is unsure about her ability to travel to the upcoming [facility]. Barbara reports that her recent conversation with her father about her sport and  concerns was not supportive/validating.    Intervention:  Empathetic listening and supportive counseling provided throughout. Offered validation and support. Aided in exploring and processing emotions re: her relationship with her  and feelings of being \"unwanted\" and \"undervalued\". Barbara expressed concerns regarding theme of inequity among her experience and the experiences of select teammates. Barbara is aware of resources available to her to express concerns and is able to actively reflect on her emotions and experiences in " "session. Regarding the difficult conversation she had with her father, writer validated Barbara's emotional response and praised her for being steadfast and convicted in what she believed was in alignment with her core values. Encouraged continued prioritization of needs and use of helpful campus resources available to her (i.e. [employer] and administrative staff). Regarding sport performance, continued identification of sport goals and Larry's \"loci of control\".    Therapy objectives/goals:  Increase self-awareness  Increase willingness to be vulnerable and experience emotions  Improve and develop time-management skills  Provide support  Teach and improve coping skills    Therapy follow-up plan:  Individuals counseling sessions bi-weekly      Selma Le PsyD  "

## 2022-04-22 ENCOUNTER — DOCUMENTATION ONLY (OUTPATIENT)
Dept: FAMILY MEDICINE | Facility: CLINIC | Age: 22
End: 2022-04-22
Payer: COMMERCIAL

## 2022-04-27 NOTE — PROGRESS NOTES
[facility] ATHLETIC MEDICINE  [facility]   Sport Psychology Progress Note      Location of Visit: Telehealth session, Barbara was located in [place], [place] with her team for the 2022 [employer]umni [employer] at [employer] competition.  Date of Visit: April 22, 2022  Duration of Session: 45 minutes    Suicide Assessment:  Barbara denies current urges to self-harm, suicidal ideation, means, plan, or intent.    Mental Status & Observations:  Barbara appeared generally alert and oriented. Dress was appropriate to the weather and occasion. Grooming and hygiene were appropriate. Eye contact was good. Speech was of normal volume. Mood was appropriate. Thought processes were relevant, logical and goal-directed. Thought content was within normal limits with no evidence of psychotic or paranoid features. Memory appeared intact. Insight and judgment appeared age appropriate with good focus in session.  She exhibited normal motor activity during the appointment.  Behavior was cooperative.     Observations and response to counseling:  Barbara arrived to session on time and was an active participant throughout. Reports readiness for [employer] competition. Noted feeling some stress/anxiety. Noted improvement in mood and continued management of interpersonal challenges with her  and balancing post graduate planning.    Intervention:  Empathetic listening and supportive counseling provided throughout. Utilized session to explore/discuss and plan for upcoming competition. Identified goals for the meet and reflect on strengths and ability. Reflected on previous meet in [place] and improved sport performance. Discussed continued advocacy for self and teammates. Discussed upcoming graduation, big tens competition, and romantic relationship.    Explored mental skills, use of positive/affirming self dialogue, present moment awareness and continued prioritization of needs and self-care.    Therapy objectives/goals:  Increase  self-awareness  Increase willingness to be vulnerable and experience emotions  Improve and develop time-management skills  Provide support  Teach and improve coping skills    Therapy follow-up plan:  Individuals counseling sessions bi-weekly      Selma Le PsyD

## 2022-05-06 ENCOUNTER — DOCUMENTATION ONLY (OUTPATIENT)
Dept: FAMILY MEDICINE | Facility: CLINIC | Age: 22
End: 2022-05-06
Payer: COMMERCIAL

## 2022-05-09 ENCOUNTER — OFFICE VISIT (OUTPATIENT)
Dept: FAMILY MEDICINE | Facility: CLINIC | Age: 22
End: 2022-05-09
Payer: COMMERCIAL

## 2022-05-09 VITALS
BODY MASS INDEX: 21.48 KG/M2 | HEIGHT: 69 IN | SYSTOLIC BLOOD PRESSURE: 104 MMHG | DIASTOLIC BLOOD PRESSURE: 72 MMHG | WEIGHT: 145 LBS | HEART RATE: 109 BPM

## 2022-05-09 DIAGNOSIS — F90.9 ATTENTION DEFICIT HYPERACTIVITY DISORDER (ADHD), UNSPECIFIED ADHD TYPE: Primary | ICD-10-CM

## 2022-05-09 RX ORDER — DEXTROAMPHETAMINE SACCHARATE, AMPHETAMINE ASPARTATE MONOHYDRATE, DEXTROAMPHETAMINE SULFATE AND AMPHETAMINE SULFATE 5; 5; 5; 5 MG/1; MG/1; MG/1; MG/1
20 CAPSULE, EXTENDED RELEASE ORAL DAILY
Qty: 30 CAPSULE | Refills: 0 | Status: SHIPPED | OUTPATIENT
Start: 2022-05-09

## 2022-05-09 NOTE — PROGRESS NOTES
[facility] ATHLETIC MEDICINE  [facility]   Sport Psychology Progress Note      Location of Visit: [facility], Office [address]  Date of Visit: May 6, 2022  Duration of Session: 45 minutes    Suicide Assessment:  Barbara denies current urges to self-harm, suicidal ideation, means, plan, or intent.    Mental Status & Observations:  Barbara appeared generally alert and oriented. Dress was appropriate to the weather and occasion. Grooming and hygiene were appropriate. Eye contact was good. Speech was of normal volume. Mood was appropriate. Thought processes were relevant, logical and goal-directed. Thought content was within normal limits with no evidence of psychotic or paranoid features. Memory appeared intact. Insight and judgment appeared age appropriate with good focus in session.  She exhibited normal motor activity during the appointment.  Behavior was cooperative.     Observations and response to counseling:  Barbara arrived to session on time and was an active participant throughout.     Exit Session:  Discussed progress made throughout sport psych services. Reviewed strengths and continued opportunities for growth. Provided support for upcoming [employer] championship meet. Barbara shared that she would be competing in multiple events and expressed excitement about this.     Barbara plans to remain in [place], [place] for work after graduation. Barbara is aware of community resources available.      Therapy follow-up plan:  No further action is planned with this provider.  Barbara will be graduating and no longer have access to services.  Barbara is aware of community resources available.    Selma Le PsyD

## 2022-05-09 NOTE — LETTER
"  5/9/2022      RE: Barbara Olivera  [address]  [place] [address]     Dear Colleague,    Thank you for referring your patient, Barbara Olivera, to the [facility]UDENT [facility]THLETIC [facility]. Please see a copy of my visit note below.    HISTORY OF PRESENT ILLNESS  Ms. Olivera is a pleasant 21 year old year old female who presents to clinic today with   Barbara explains that     MEDICAL HISTORY  Patient Active Problem List   Diagnosis     Pain in joint, lower leg       Current Outpatient Medications   Medication Sig Dispense Refill     amphetamine-dextroamphetamine (ADDERALL XR) 15 MG 24 hr capsule Take 1 capsule (15 mg) by mouth daily 30 capsule 0     aspirin-acetaminophen-caffeine (EXCEDRIN MIGRAINE) 250-250-65 MG tablet Take 1 tablet by mouth every 6 hours as needed for headaches       levonorgestrel (MIRENA) 20 MCG/24HR IUD 1 each by Intrauterine route once         Allergies   Allergen Reactions     Seasonal Allergies        Family History   Problem Relation Age of Onset     Lung Cancer Maternal Grandfather      Ovarian Cancer Paternal Grandmother      Autism Spectrum Disorder Brother      Social History     Socioeconomic History     Marital status: Single   Tobacco Use     Smoking status: Never Smoker     Smokeless tobacco: Never Used   Substance and Sexual Activity     Alcohuse: No     Drug use: No     Sexual activity: Never       Additional medical/Social/Surgical histories reviewed in [facility] and updated as appropriate.     REVIEW OF SYSTEMS (5/9/2022)  10 point ROS of systems including Constitutional, Eyes, Respiratory, Cardiovascular, Gastroenterology, Genitourinary, Integumentary, Musculoskeletal, Psychiatric, Allergic/Immunologic were all negative except for pertinent positives noted in my HPI.     PHYSICAL EXAM  Vitals:    05/09/22 1510   BP: 104/72   Pulse: 109   Weight: 65.8 kg (145 lb)   Height: 1.753 m (5' 9\")     Vital Signs: /72   Pulse 109   Ht 1.753 m (5' 9\")   Wt 65.8 kg (145 lb)   BMI 21.41 kg/m   " Patient declined being weighed. Body mass index is 21.41 kg/m .    General  - normal appearance, in no obvious distress  HEENT  - conjunctivae not injected, moist mucous membranes, normocephalic/atraumatic head, ears normal appearance, no lesions, mouth normal appearance, no scars, normal dentition and teeth present  CV  - normal peripheral perfusion  Pulm  - normal respiratory pattern, non-labored    Musculoskeletal - CERVICAL SPINE  - stance and posture: normal gait without limp, no obvious leg length discrepancy, normal heel and toe walk, normal balance, slightly forward shoulders, head balanced normally on trunk  - inspection: normal bone and joint alignment, no obvious kyphosis  - palpation: no paravertebral or bony tenderness, except at base of neck and trapezius muscles  - ROM: normal and painless flexion, extension, left and right sidebending and rotation with some discomfort  - strength: upper extremities 5/5 in all planes    Neuro  - biceps, triceps, supinator DTRs 2+ bilaterally, no sensory or motor deficit, grossly normal coordination, normal muscle tone  Skin  - no ecchymosis, erythema, warmth, or induration, no obvious rash  Psych  - interactive, appropriate, normal mood and affect  ASSESSMENT & PLAN    23 yo female with adhd  Stable     Discussed and refilled adderall- 20mg xr increase to see if it lasts longer    Appropriate PPE was utilized for prevention of spread of Covid-19.  Hi Cruz MD, [facility]        Again, thank you for allowing me to participate in the care of your patient.      Sincerely,    Hi Cruz MD

## 2022-05-09 NOTE — LETTER
Date:May 19, 2022      Patient was self referred, no letter generated. Do not send.        [facility] Information

## 2022-05-09 NOTE — PROGRESS NOTES
"HISTORY OF PRESENT ILLNESS  Ms. Olivera is a pleasant 21 year old year old female who presents to clinic today with   Barbara explains that     MEDICAL HISTORY  Patient Active Problem List   Diagnosis     Pain in joint, lower leg       Current Outpatient Medications   Medication Sig Dispense Refill     amphetamine-dextroamphetamine (ADDERALL XR) 15 MG 24 hr capsule Take 1 capsule (15 mg) by mouth daily 30 capsule 0     aspirin-acetaminophen-caffeine (EXCEDRIN MIGRAINE) 250-250-65 MG tablet Take 1 tablet by mouth every 6 hours as needed for headaches       levonorgestrel (MIRENA) 20 MCG/24HR IUD 1 each by Intrauterine route once         Allergies   Allergen Reactions     Seasonal Allergies        Family History   Problem Relation Age of Onset     Lung Cancer Maternal Grandfather      Ovarian Cancer Paternal Grandmother      Autism Spectrum Disorder Brother      Social History     Socioeconomic History     Marital status: Single   Tobacco Use     Smoking status: Never Smoker     Smokeless tobacco: Never Used   Substance and Sexual Activity     Alcohuse: No     Drug use: No     Sexual activity: Never       Additional medical/Social/Surgical histories reviewed in [facility] and updated as appropriate.     REVIEW OF SYSTEMS (5/9/2022)  10 point ROS of systems including Constitutional, Eyes, Respiratory, Cardiovascular, Gastroenterology, Genitourinary, Integumentary, Musculoskeletal, Psychiatric, Allergic/Immunologic were all negative except for pertinent positives noted in my HPI.     PHYSICAL EXAM  Vitals:    05/09/22 1510   BP: 104/72   Pulse: 109   Weight: 65.8 kg (145 lb)   Height: 1.753 m (5' 9\")     Vital Signs: /72   Pulse 109   Ht 1.753 m (5' 9\")   Wt 65.8 kg (145 lb)   BMI 21.41 kg/m   Patient declined being weighed. Body mass index is 21.41 kg/m .    General  - normal appearance, in no obvious distress  HEENT  - conjunctivae not injected, moist mucous membranes, normocephalic/atraumatic head, ears normal " appearance, no lesions, mouth normal appearance, no scars, normal dentition and teeth present  CV  - normal peripheral perfusion  Pulm  - normal respiratory pattern, non-labored    Musculoskeletal - CERVICAL SPINE  - stance and posture: normal gait without limp, no obvious leg length discrepancy, normal heel and toe walk, normal balance, slightly forward shoulders, head balanced normally on trunk  - inspection: normal bone and joint alignment, no obvious kyphosis  - palpation: no paravertebral or bony tenderness, except at base of neck and trapezius muscles  - ROM: normal and painless flexion, extension, left and right sidebending and rotation with some discomfort  - strength: upper extremities 5/5 in all planes    Neuro  - biceps, triceps, supinator DTRs 2+ bilaterally, no sensory or motor deficit, grossly normal coordination, normal muscle tone  Skin  - no ecchymosis, erythema, warmth, or induration, no obvious rash  Psych  - interactive, appropriate, normal mood and affect  ASSESSMENT & PLAN    21 yo female with adhd  Stable     Discussed and refilled adderall- 20mg xr increase to see if it lasts longer    Appropriate PPE was utilized for prevention of spread of Covid-19.  Hi Cruz MD, CAQSM

## 2022-05-15 ENCOUNTER — HEALTH MAINTENANCE LETTER (OUTPATIENT)
Age: 22
End: 2022-05-15

## 2022-09-11 ENCOUNTER — HEALTH MAINTENANCE LETTER (OUTPATIENT)
Age: 22
End: 2022-09-11

## 2023-05-17 NOTE — PROGRESS NOTES
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 Since our last visit on 11/25/20, she wore a CAM boot for 3 weeks and has been cross training, resting from impact. Feeling good now, no pain.          Medications:     Current Outpatient Medications   Medication Sig     levonorgestrel (MIRENA) 20 MCG/24HR IUD 1 each by Intrauterine route once     amoxicillin-clavulanate (AUGMENTIN) 500-125 MG per tablet Take 1 tablet by mouth 2 times daily (Patient not taking: Reported on 1/30/2019)     aspirin-acetaminophen-caffeine (EXCEDRIN MIGRAINE) 250-250-65 MG tablet Take 1 tablet by mouth every 6 hours as needed for headaches     No current facility-administered medications for this visit.              Allergies:      Allergies   Allergen Reactions     Seasonal Allergies             Review of Systems:   A comprehensive 10 point review of systems (constitutional, ENT, cardiac, peripheral vascular, respiratory, GI, , Musculoskeletal, skin, Neurological) was performed and found to be negative except as described in this note.           Physical Exam:   COMPLETE EXAMINATION:   VITAL SIGNS: /84   Pulse 96   Ht 1.753 m (5' 9\")   Wt 66.1 kg (145 lb 12.8 oz)   LMP 11/23/2020 (Approximate)   BMI 21.53 kg/m    GEN: Alert, well-nourished, and in no distress.   NEURO: Alert and oriented to person, place, and time. Gait normal.   SKIN: No rash, warmth or erythema  PSYCH: Mood, memory, affect and judgment normal.   MSK: left foot: minimal TTP along proximal 3rd MT, no pain with SLH or MT squeeze.           Assessment:     Healing left 3rd MT stress reaction        Plan:     1. Continue to cross train and rest from impact for one more week, then may begin a gradual RTP under ATC guidance. "   2. If pain recurs, MRI.    Karal Mercado M.D.    CAESAR Hannah was present for the entire visit.     [Follow-Up] : a follow-up visit [Asthma] : asthma [Shortness of Breath] : shortness of breath [Wheezing] : wheezing

## 2023-06-03 ENCOUNTER — HEALTH MAINTENANCE LETTER (OUTPATIENT)
Age: 23
End: 2023-06-03

## 2024-07-07 ENCOUNTER — HEALTH MAINTENANCE LETTER (OUTPATIENT)
Age: 24
End: 2024-07-07